# Patient Record
Sex: MALE | Race: ASIAN | NOT HISPANIC OR LATINO | Employment: FULL TIME | URBAN - METROPOLITAN AREA
[De-identification: names, ages, dates, MRNs, and addresses within clinical notes are randomized per-mention and may not be internally consistent; named-entity substitution may affect disease eponyms.]

---

## 2017-02-08 ENCOUNTER — TRANSFERRED RECORDS (OUTPATIENT)
Dept: HEALTH INFORMATION MANAGEMENT | Facility: CLINIC | Age: 57
End: 2017-02-08

## 2017-02-16 ENCOUNTER — OFFICE VISIT (OUTPATIENT)
Dept: FAMILY MEDICINE | Facility: CLINIC | Age: 57
End: 2017-02-16
Payer: COMMERCIAL

## 2017-02-16 VITALS
DIASTOLIC BLOOD PRESSURE: 83 MMHG | SYSTOLIC BLOOD PRESSURE: 132 MMHG | HEIGHT: 70 IN | TEMPERATURE: 98.3 F | HEART RATE: 71 BPM | BODY MASS INDEX: 23.08 KG/M2 | WEIGHT: 161.2 LBS

## 2017-02-16 DIAGNOSIS — H11.33 SUBCONJUNCTIVAL HEMORRHAGE OF BOTH EYES: ICD-10-CM

## 2017-02-16 DIAGNOSIS — Z11.59 NEED FOR HEPATITIS C SCREENING TEST: Primary | ICD-10-CM

## 2017-02-16 PROBLEM — Z71.89 ADVANCED DIRECTIVES, COUNSELING/DISCUSSION: Status: ACTIVE | Noted: 2017-02-16

## 2017-02-16 LAB
APTT PPP: 28 SEC (ref 22–37)
BASOPHILS # BLD AUTO: 0 10E9/L (ref 0–0.2)
BASOPHILS NFR BLD AUTO: 0.6 %
DIFFERENTIAL METHOD BLD: NORMAL
EOSINOPHIL # BLD AUTO: 0.3 10E9/L (ref 0–0.7)
EOSINOPHIL NFR BLD AUTO: 5 %
ERYTHROCYTE [DISTWIDTH] IN BLOOD BY AUTOMATED COUNT: 12.9 % (ref 10–15)
HCT VFR BLD AUTO: 47.8 % (ref 40–53)
HCV AB SERPL QL IA: NORMAL
HGB BLD-MCNC: 15.6 G/DL (ref 13.3–17.7)
INR PPP: 0.99 (ref 0.86–1.14)
LYMPHOCYTES # BLD AUTO: 1.6 10E9/L (ref 0.8–5.3)
LYMPHOCYTES NFR BLD AUTO: 24.6 %
MCH RBC QN AUTO: 28.6 PG (ref 26.5–33)
MCHC RBC AUTO-ENTMCNC: 32.6 G/DL (ref 31.5–36.5)
MCV RBC AUTO: 88 FL (ref 78–100)
MONOCYTES # BLD AUTO: 0.4 10E9/L (ref 0–1.3)
MONOCYTES NFR BLD AUTO: 6.2 %
NEUTROPHILS # BLD AUTO: 4.2 10E9/L (ref 1.6–8.3)
NEUTROPHILS NFR BLD AUTO: 63.6 %
PLATELET # BLD AUTO: 239 10E9/L (ref 150–450)
RBC # BLD AUTO: 5.46 10E12/L (ref 4.4–5.9)
WBC # BLD AUTO: 6.6 10E9/L (ref 4–11)

## 2017-02-16 PROCEDURE — 99213 OFFICE O/P EST LOW 20 MIN: CPT | Performed by: FAMILY MEDICINE

## 2017-02-16 PROCEDURE — 86803 HEPATITIS C AB TEST: CPT | Performed by: FAMILY MEDICINE

## 2017-02-16 PROCEDURE — 85025 COMPLETE CBC W/AUTO DIFF WBC: CPT | Performed by: FAMILY MEDICINE

## 2017-02-16 PROCEDURE — 85730 THROMBOPLASTIN TIME PARTIAL: CPT | Performed by: FAMILY MEDICINE

## 2017-02-16 PROCEDURE — 85610 PROTHROMBIN TIME: CPT | Performed by: FAMILY MEDICINE

## 2017-02-16 PROCEDURE — 36415 COLL VENOUS BLD VENIPUNCTURE: CPT | Performed by: FAMILY MEDICINE

## 2017-02-16 NOTE — PROGRESS NOTES
"SUBJECTIVE:  56 year old.The patient has a history of subconjunctival hemorrhages .  This started years ago. Location both eys he was seen by optometry at [facility] and it was suggested to get blood studies Associated symptoms are epistaxis.. ROS no blurred vision, blood in stool. melena  Reviewed health maintenance  Patient Active Problem List   Diagnosis     CARDIOVASCULAR SCREENING; LDL GOAL LESS THAN 160     Dry eye syndrome     TB (tuberculosis), treated     Diagnostic skin and sensitization tests     Seasonal allergic rhinitis     Seasonal allergic conjunctivitis     Allergic rhinitis due to animal dander     Allergy to mold spores     Rhinitis, allergic to other allergen     Esophageal reflux     Advanced directives, counseling/discussion     Cough     Hemoptysis     Pulmonary tuberculosis     Past Medical History   Diagnosis Date     Allergic rhinitis due to animal dander      Allergy to mold spores      6/11/12 skin tests pos. for:  cat/dog/CR/DM/M/T/G/W     Bell's palsy      Diagnostic skin and sensitization tests 6/11/12 skin tests pos. for:  cat/dog/CR/DM/M/T/G/W     Need for desensitization to allergens IT 6/12 to 8/13 only-stopped by pt, no hx of rxn--IT for cat/dog/DM/M/T/G/W      Rhinitis, allergic to other allergen      Seasonal allergic conjunctivitis      Seasonal allergic rhinitis        OBJECTIVE:  no apparent distress  /83  Pulse 71  Temp 98.3  F (36.8  C) (Oral)  Ht 5' 10\" (1.778 m)  Wt 161 lb 3.2 oz (73.1 kg)  BMI 23.13 kg/m2  Sclera is clear.   LUNGS:  CTA B/L, no wheezing or crackles.   Cardiovascular: negative, PMI normal. No lifts, heaves, or thrills. RRR. No murmurs, clicks gallops or rub   Gastrointestinal: Abdomen soft, non-tender. BS normal. No masses, organomegaly       ICD-10-CM    1. Need for hepatitis C screening test Z11.59 Hepatitis C Screen Reflex to HCV RNA Quant and Genotype   2. Subconjunctival hemorrhage of both eyes H11.33 CBC with platelets and differential     " INR     Partial thromboplastin time    PLAN: await blood work

## 2017-02-16 NOTE — NURSING NOTE
"Chief Complaint   Patient presents with     Eye Problem       Initial /83  Pulse 71  Temp 98.3  F (36.8  C) (Oral)  Ht 5' 10\" (1.778 m)  Wt 161 lb 3.2 oz (73.1 kg)  BMI 23.13 kg/m2 Estimated body mass index is 23.13 kg/(m^2) as calculated from the following:    Height as of this encounter: 5' 10\" (1.778 m).    Weight as of this encounter: 161 lb 3.2 oz (73.1 kg).  Medication Reconciliation: complete  Dick Hodge CMA    "

## 2017-09-20 ENCOUNTER — OFFICE VISIT (OUTPATIENT)
Dept: FAMILY MEDICINE | Facility: CLINIC | Age: 57
End: 2017-09-20
Payer: COMMERCIAL

## 2017-09-20 VITALS
SYSTOLIC BLOOD PRESSURE: 131 MMHG | TEMPERATURE: 97.7 F | BODY MASS INDEX: 22.7 KG/M2 | WEIGHT: 158.2 LBS | HEART RATE: 67 BPM | DIASTOLIC BLOOD PRESSURE: 84 MMHG

## 2017-09-20 DIAGNOSIS — R06.83 SNORING: ICD-10-CM

## 2017-09-20 DIAGNOSIS — Z23 NEED FOR VACCINATION: ICD-10-CM

## 2017-09-20 DIAGNOSIS — Z00.00 ROUTINE GENERAL MEDICAL EXAMINATION AT A HEALTH CARE FACILITY: Primary | ICD-10-CM

## 2017-09-20 DIAGNOSIS — K21.9 GASTROESOPHAGEAL REFLUX DISEASE WITHOUT ESOPHAGITIS: ICD-10-CM

## 2017-09-20 PROCEDURE — 90471 IMMUNIZATION ADMIN: CPT | Performed by: FAMILY MEDICINE

## 2017-09-20 PROCEDURE — 90715 TDAP VACCINE 7 YRS/> IM: CPT | Performed by: FAMILY MEDICINE

## 2017-09-20 PROCEDURE — 99213 OFFICE O/P EST LOW 20 MIN: CPT | Mod: 25 | Performed by: FAMILY MEDICINE

## 2017-09-20 PROCEDURE — 99396 PREV VISIT EST AGE 40-64: CPT | Mod: 25 | Performed by: FAMILY MEDICINE

## 2017-09-20 NOTE — NURSING NOTE
Prior to injection verified patient identity using patient's name and date of birth.    Patient instructed to wait in clinic for 20 minutes following injection and to notify staff of any adverse reactions immediately.     Screening Questionnaire for Adult Immunization     Are you sick today?   No   Do you have allergies to medications, food or any vaccine?   No   Have you ever had a serious reaction after receiving a vaccination?   No   Do you have a long-term health problem with heart disease, lung disease,  asthma, kidney disease, diabetes, anemia, metabolic or blood disease?   No   Do you have cancer, leukemia, AIDS, or any immune system problem?   No   Do you take cortisone, prednisone, other steroids, or anticancer drugs, or  have you had any x-ray (radiation) treatments?   No   Have you had a seizure, brain, or other nervous system problem?   No   During the past year, have you received a transfusion of blood or blood       products, or been given a medicine called immune (gamma) globulin?   No   For women: Are you pregnant or is there a chance you could become         pregnant during the next month?   No   Have you received any vaccinations in the past 4 weeks?   No    Immunization questionnaire answers were all negative.      MNVFC doesn't apply on this patient  Dick Hodge CMA  Prior to injection verified patient identity using patient's name and date of birth.  Per orders of Dr. Hargrove, injection of TDAP given by Dick Alvarez. Patient instructed to remain in clinic for 15 minutes afterwards, and to report any adverse reaction to me immediately.

## 2017-09-20 NOTE — NURSING NOTE
"Chief Complaint   Patient presents with     Physical       Initial /84  Pulse 67  Temp 97.7  F (36.5  C) (Oral)  Wt 158 lb 3.2 oz (71.8 kg)  BMI 22.7 kg/m2 Estimated body mass index is 22.7 kg/(m^2) as calculated from the following:    Height as of 2/16/17: 5' 10\" (1.778 m).    Weight as of this encounter: 158 lb 3.2 oz (71.8 kg).  Medication Reconciliation: complete  Dick Hodge CMA    "

## 2017-09-20 NOTE — PROGRESS NOTES
SUBJECTIVE:   CC: Stacia Abernathy is an 56 year old male who presents for preventative health visit.     Physical   Annual:     Getting at least 3 servings of Calcium per day::  Yes    Bi-annual eye exam::  Yes    Dental care twice a year::  Yes    Sleep apnea or symptoms of sleep apnea::  Excessive snoring    Diet::  Regular (no restrictions) and Diabetic    Taking medications regularly::  Yes    Medication side effects::  None    Additional concerns today::  No            Heart burn x 1.5-2 months    SUBJECTIVE:  56 year old.The patient has a history of gerd  Symptoms include heartburn. Caffeine intake quit when started. Etoh none asa or nsaids none smoking n. Better with time.  Worse with after a meal.  Nausea not presently vomiting  no. Endoscopy  Three years ago  Reviewed health maintenance   Patient Active Problem List   Diagnosis     CARDIOVASCULAR SCREENING; LDL GOAL LESS THAN 160     Dry eye syndrome     TB (tuberculosis), treated     Diagnostic skin and sensitization tests     Seasonal allergic rhinitis     Seasonal allergic conjunctivitis     Allergic rhinitis due to animal dander     Allergy to mold spores     Rhinitis, allergic to other allergen     Esophageal reflux     Advanced directives, counseling/discussion     Cough     Hemoptysis     Pulmonary tuberculosis     Gastroesophageal reflux disease without esophagitis     Snoring                   Today's PHQ-2 Score:   PHQ-2 ( 1999 Pfizer) 9/20/2017   Q1: Little interest or pleasure in doing things 0   Q2: Feeling down, depressed or hopeless 0   PHQ-2 Score 0   Q1: Little interest or pleasure in doing things Not at all   Q2: Feeling down, depressed or hopeless Not at all   PHQ-2 Score 0       Abuse: Current or Past(Physical, Sexual or Emotional)- No  Do you feel safe in your environment - Yes    Social History   Substance Use Topics     Smoking status: Former Smoker     Quit date: 9/11/2008     Smokeless tobacco: Never Used     Alcohol use Yes      Comment:  OCC     The patient does not drink >3 drinks per day nor >7 drinks per week.    Last PSA: No results found for: PSA    Reviewed orders with patient. Reviewed health maintenance and updated orders accordingly - Yes       Reviewed and updated as needed this visit by clinical staff  Tobacco  Allergies  Meds  Med Hx  Surg Hx  Fam Hx  Soc Hx        Reviewed and updated as needed this visit by Provider            ROS:  C: NEGATIVE for fever, chills, change in weight  I: NEGATIVE for worrisome rashes, moles or lesions  E: NEGATIVE for vision changes or irritation  ENT: NEGATIVE for ear, mouth and throat problems  R: NEGATIVE for significant cough or SOB  CV: NEGATIVE for chest pain, palpitations or peripheral edema   male: negative for dysuria, hematuria, decreased urinary stream, erectile dysfunction, urethral discharge  M: NEGATIVE for significant arthralgias or myalgia  N: NEGATIVE for weakness, dizziness or paresthesias  P: NEGATIVE for changes in mood or affect  Snores a lot and stops breathing sometimes.  He often wakes up and can't go back to sleep.  No napping during the day and does not have fatigue  OBJECTIVE:   /84  Pulse 67  Temp 97.7  F (36.5  C) (Oral)  Wt 158 lb 3.2 oz (71.8 kg)  BMI 22.7 kg/m2    EXAM:  GENERAL: healthy, alert and no distress  EYES: Eyes grossly normal to inspection, PERRL and conjunctivae and sclerae normal  HENT: ear canals and TM's normal, nose and mouth without ulcers or lesions  NECK: no adenopathy, no asymmetry, masses, or scars and thyroid normal to palpation  RESP: lungs clear to auscultation - no rales, rhonchi or wheezes  CV: regular rate and rhythm, normal S1 S2, no S3 or S4, no murmur, click or rub, no peripheral edema and peripheral pulses strong  ABDOMEN: soft, nontender, no hepatosplenomegaly, no masses and bowel sounds normal  MS: no gross musculoskeletal defects noted, no edema  SKIN: no suspicious lesions or rashes  NEURO: Normal strength and tone,  "mentation intact and speech normal  PSYCH: mentation appears normal, affect normal/bright    ASSESSMENT/PLAN:       ICD-10-CM    1. Routine general medical examination at a health care facility Z00.00    2. Gastroesophageal reflux disease without esophagitis K21.9 omeprazole (PRILOSEC) 20 MG CR capsule   3. Snoring R06.83 SLEEP EVALUATION & MANAGEMENT REFERRAL - ADULT       COUNSELING:   Reviewed preventive health counseling, as reflected in patient instructions       Regular exercise       Healthy diet/nutrition    BP Screening:   Last 3 BP Readings:    BP Readings from Last 3 Encounters:   09/20/17 131/84   02/16/17 132/83   01/14/16 117/77       The following was recommended to the patient:  Re-screen BP within a year and recommended lifestyle modifications       reports that he quit smoking about 9 years ago. He has never used smokeless tobacco.      Estimated body mass index is 22.7 kg/(m^2) as calculated from the following:    Height as of 2/16/17: 5' 10\" (1.778 m).    Weight as of this encounter: 158 lb 3.2 oz (71.8 kg).     Recheck one month if heart burn not relieved    Counseling Resources:  ATP IV Guidelines  Pooled Cohorts Equation Calculator  FRAX Risk Assessment  ICSI Preventive Guidelines  Dietary Guidelines for Americans, 2010  USDA's MyPlate  ASA Prophylaxis  Lung CA Screening    Juan Jose Hargrove MD  [facility]UST for HPI/ROS submitted by the patient on 9/20/2017   PHQ-2 Score: 0    "

## 2017-09-20 NOTE — PROGRESS NOTES
"  SUBJECTIVE:   CC: Stacia Abernathy is an 56 year old male who presents for preventative health visit.     Healthy Habits:    Do you get at least three servings of calcium containing foods daily (dairy, green leafy vegetables, etc.)? {YES/NO, DAIRY INTAKE:893633::\"yes\"}    Amount of exercise or daily activities, outside of work: {AMOUNT EXERCISE:241721}    Problems taking medications regularly {Yes /No default:900267::\"No\"}    Medication side effects: {Yes /No default.:643860::\"No\"}    Have you had an eye exam in the past two years? {YESNOBLANK:121888}    Do you see a dentist twice per year? {YESNOBLANK:297677}    Do you have sleep apnea, excessive snoring or daytime drowsiness?{YESNOBLANK:561999}    {Outside tests to abstract? :835240}    {additional problems to add (Optional):280018}    Today's PHQ-2 Score:   PHQ-2 ( 1999 Pfizer) 2/16/2017 1/14/2016   Q1: Little interest or pleasure in doing things 0 0   Q2: Feeling down, depressed or hopeless 0 0   PHQ-2 Score 0 0     {PHQ-2 LOOK IN ASSESSMENTS :454306}  Abuse: Current or Past(Physical, Sexual or Emotional)- {YES/NO/NA:726642}  Do you feel safe in your environment - {YES/NO/NA:742640}    Social History   Substance Use Topics     Smoking status: Former Smoker     Quit date: 9/11/2008     Smokeless tobacco: Never Used     Alcohol use Yes      Comment: OCC     {ETOH AUDIT:890791}    Last PSA: No results found for: PSA    Reviewed orders with patient. Reviewed health maintenance and updated orders accordingly - {Yes/No:538712::\"Yes\"}  {Chronicprobdata (Optional):729534}    Reviewed and updated as needed this visit by clinical staff         Reviewed and updated as needed this visit by Provider        {HISTORY OPTIONS (Optional):890524}    ROS:  {MALE ROS-adult preventive care package:453866::\"C: NEGATIVE for fever, chills, change in weight\",\"I: NEGATIVE for worrisome rashes, moles or lesions\",\"E: NEGATIVE for vision changes or irritation\",\"ENT: NEGATIVE for ear, mouth and " "throat problems\",\"R: NEGATIVE for significant cough or SOB\",\"CV: NEGATIVE for chest pain, palpitations or peripheral edema\",\"GI: NEGATIVE for nausea, abdominal pain, heartburn, or change in bowel habits\",\" male: negative for dysuria, hematuria, decreased urinary stream, erectile dysfunction, urethral discharge\",\"M: NEGATIVE for significant arthralgias or myalgia\",\"N: NEGATIVE for weakness, dizziness or paresthesias\",\"P: NEGATIVE for changes in mood or affect\"}    OBJECTIVE:   There were no vitals taken for this visit.  EXAM:  {Exam Choices:425213}    ASSESSMENT/PLAN:   {Diag Picklist:562309}    COUNSELING:  {MALE COUNSELING MESSAGES:461694::\"Reviewed preventive health counseling, as reflected in patient instructions\"}    {BP Counseling- Complete if BP >= 120/80  (Optional):930038}     reports that he quit smoking about 9 years ago. He has never used smokeless tobacco.  {Tobacco Cessation -- Complete if patient is a smoker (Optional):074748}  Estimated body mass index is 23.13 kg/(m^2) as calculated from the following:    Height as of 2/16/17: 5' 10\" (1.778 m).    Weight as of 2/16/17: 161 lb 3.2 oz (73.1 kg).   {Weight Management Plan (ACO) Complete if BMI is abnormal-  Ages 18-64  BMI >24.9.  Age 65+ with BMI <23 or >30 (Optional):624614}    Counseling Resources:  ATP IV Guidelines  Pooled Cohorts Equation Calculator  FRAX Risk Assessment  ICSI Preventive Guidelines  Dietary Guidelines for Americans, 2010  USDA's MyPlate  ASA Prophylaxis  Lung CA Screening    Juan Jose Hargrove MD  [facility]  "

## 2017-09-20 NOTE — MR AVS SNAPSHOT
After Visit Summary   9/20/2017    Stacia Abernathy    MRN: [id number]           Patient Information     Date Of Birth          1960        Visit Information        Provider Department      9/20/2017 8:00 AM Juan Jose Hargrove MD [facility]        Today's Diagnoses     Routine general medical examination at a health care facility    -  1    Gastroesophageal reflux disease without esophagitis        Snoring        Need for vaccination          Care Instructions      Preventive Health Recommendations  Male Ages 50 - 64    Yearly exam:             See your health care provider every year in order to  o   Review health changes.   o   Discuss preventive care.    o   Review your medicines if your doctor has prescribed any.     Have a cholesterol test every 5 years, or more frequently if you are at risk for high cholesterol/heart disease.     Have a diabetes test (fasting glucose) every three years. If you are at risk for diabetes, you should have this test more often.     Have a colonoscopy at age 50, or have a yearly FIT test (stool test). These exams will check for colon cancer.      Talk with your health care provider about whether or not a prostate cancer screening test (PSA) is right for you.    You should be tested each year for STDs (sexually transmitted diseases), if you re at risk.     Shots: Get a flu shot each year. Get a tetanus shot every 10 years.     Nutrition:    Eat at least 5 servings of fruits and vegetables daily.     Eat whole-grain bread, whole-wheat pasta and brown rice instead of white grains and rice.     Talk to your provider about Calcium and Vitamin D.     Lifestyle    Exercise for at least 150 minutes a week (30 minutes a day, 5 days a week). This will help you control your weight and prevent disease.     Limit alcohol to one drink per day.     No smoking.     Wear sunscreen to prevent skin cancer.     See your dentist every six months for an exam and cleaning.      See your eye doctor every 1 to 2 years.            Follow-ups after your visit        Additional Services     SLEEP EVALUATION & MANAGEMENT REFERRAL - ADULT       Please be aware that coverage of these services is subject to the terms and limitations of your health insurance plan.  Call member services at your health plan with any benefit or coverage questions.      Please bring the following to your appointment:    >>   List of current medications   >>   This referral request   >>   Any documents/labs given to you for this referral    [facility]  Ph [phone] (Age 15 and up)                  Who to contact     If you have questions or need follow up information about today's clinic visit or your schedule please contact [facility] directly at [phone].  Normal or non-critical lab and imaging results will be communicated to you by MyChart, letter or phone within 4 business days after the clinic has received the results. If you do not hear from us within 7 days, please contact the clinic through [employer] or phone. If you have a critical or abnormal lab result, we will notify you by phone as soon as possible.  Submit refill requests through [employer] or call your pharmacy and they will forward the refill request to us. Please allow 3 business days for your refill to be completed.          Additional Information About Your Visit        MyChart Information     [employer] gives you secure access to your electronic health record. If you see a primary care provider, you can also send messages to your care team and make appointments. If you have questions, please call your primary care clinic.  If you do not have a primary care provider, please call [phone] and they will assist you.        Care EveryWhere ID     This is your Care EveryWhere ID. This could be used by other organizations to access your [place] medical records  [id number]        Your Vitals Were     Pulse  Temperature BMI (Body Mass Index)             67 97.7  F (36.5  C) (Oral) 22.7 kg/m2          Blood Pressure from Last 3 Encounters:   09/20/17 131/84   02/16/17 132/83   01/14/16 117/77    Weight from Last 3 Encounters:   09/20/17 158 lb 3.2 oz (71.8 kg)   02/16/17 161 lb 3.2 oz (73.1 kg)   01/14/16 163 lb (73.9 kg)              We Performed the Following     1st  Administration  [34739]     TDAP VACCINE (ADACEL) [41698.002]          Today's Medication Changes          These changes are accurate as of: 9/20/17 11:59 PM.  If you have any questions, ask your nurse or doctor.               Start taking these medicines.        Dose/Directions    omeprazole 20 MG CR capsule   Commonly known as:  priLOSEC   Used for:  Gastroesophageal reflux disease without esophagitis   Started by:  Juan Jose Hargrove MD        Dose:  20 mg   Take 1 capsule (20 mg) by mouth daily   Quantity:  30 capsule   Refills:  1            Where to get your medicines      These medications were sent to [facility]/pharmacy #[id number] - [address],  [address] [address]     Phone:  [phone]     omeprazole 20 MG CR capsule                Primary Care Provider Office Phone # Fax #    Juan Jose Hargrove [phone] [address] [facility] [address]        Equal Access to Services     GAVIN MENODSA AH: Hadii aad ku hadasho Soomaali, waaxda luqadaha, qaybta kaalmada adeegyada, waxay idiin hayaan adeeg kharash la'aan . [facility] [phone].    ATENCIÓN: Si habla español, tiene a renteria disposición servicios gratuitos de asistencia lingüística. Llame al [phone].    We comply with applicable federal civil rights laws and [place] laws. We do not discriminate on the basis of race, color, national origin, age, disability sex, sexual orientation or gender identity.            Thank you!     Thank you for choosing [facility]  for your  care. Our goal is always to provide you with excellent care. Hearing back from our patients is one way we can continue to improve our services. Please take a few minutes to complete the written survey that you may receive in the mail after your visit with us. Thank you!             Your Updated Medication List - Protect others around you: Learn how to safely use, store and throw away your medicines at www.disposemymeds.org.          This list is accurate as of: 9/20/17 11:59 PM.  Always use your most recent med list.                   Brand Name Dispense Instructions for use Diagnosis    omeprazole 20 MG CR capsule    priLOSEC    30 capsule    Take 1 capsule (20 mg) by mouth daily    Gastroesophageal reflux disease without esophagitis

## 2018-01-19 ENCOUNTER — OFFICE VISIT (OUTPATIENT)
Dept: URGENT CARE | Facility: URGENT CARE | Age: 58
End: 2018-01-19
Payer: COMMERCIAL

## 2018-01-19 VITALS
TEMPERATURE: 97.5 F | WEIGHT: 158.6 LBS | OXYGEN SATURATION: 97 % | BODY MASS INDEX: 22.76 KG/M2 | SYSTOLIC BLOOD PRESSURE: 125 MMHG | DIASTOLIC BLOOD PRESSURE: 73 MMHG | HEART RATE: 82 BPM

## 2018-01-19 DIAGNOSIS — R05.9 COUGH: ICD-10-CM

## 2018-01-19 DIAGNOSIS — J01.90 ACUTE SINUSITIS WITH SYMPTOMS > 10 DAYS: Primary | ICD-10-CM

## 2018-01-19 PROCEDURE — 99214 OFFICE O/P EST MOD 30 MIN: CPT | Performed by: FAMILY MEDICINE

## 2018-01-19 RX ORDER — AMOXICILLIN 875 MG
875 TABLET ORAL 2 TIMES DAILY
Qty: 14 TABLET | Refills: 0 | Status: SHIPPED | OUTPATIENT
Start: 2018-01-19 | End: 2018-01-26

## 2018-01-19 RX ORDER — CODEINE PHOSPHATE/GUAIFENESIN 10-100MG/5
LIQUID (ML) ORAL
Qty: 150 ML | Refills: 0 | Status: SHIPPED | OUTPATIENT
Start: 2018-01-19 | End: 2018-01-26

## 2018-01-19 NOTE — MR AVS SNAPSHOT
After Visit Summary   1/19/2018    Stacia Abernathy    MRN: [id number]           Patient Information     Date Of Birth          1960        Visit Information        Provider Department      1/19/2018 6:15 PM Columba Slater MD [facility]        Today's Diagnoses     Acute sinusitis with symptoms > 10 days    -  1    Cough           Follow-ups after your visit        Who to contact     If you have questions or need follow up information about today's clinic visit or your schedule please contact [facility] directly at [phone].  Normal or non-critical lab and imaging results will be communicated to you by [employer], letter or phone within 4 business days after the clinic has received the results. If you do not hear from us within 7 days, please contact the clinic through [employer] or phone. If you have a critical or abnormal lab result, we will notify you by phone as soon as possible.  Submit refill requests through [employer] or call your pharmacy and they will forward the refill request to us. Please allow 3 business days for your refill to be completed.          Additional Information About Your Visit        MyChart Information     [employer] gives you secure access to your electronic health record. If you see a primary care provider, you can also send messages to your care team and make appointments. If you have questions, please call your primary care clinic.  If you do not have a primary care provider, please call [phone] and they will assist you.        Care EveryWhere ID     This is your Care EveryWhere ID. This could be used by other organizations to access your [place] medical records  [id number]        Your Vitals Were     Pulse Temperature Pulse Oximetry BMI (Body Mass Index)          82 97.5  F (36.4  C) (Tympanic) 97% 22.76 kg/m2         Blood Pressure from Last 3 Encounters:   01/19/18 125/73   09/20/17 131/84   02/16/17 132/83    Weight from Last 3  Encounters:   01/19/18 158 lb 9.6 oz (71.9 kg)   09/20/17 158 lb 3.2 oz (71.8 kg)   02/16/17 161 lb 3.2 oz (73.1 kg)              Today, you had the following     No orders found for display         Today's Medication Changes          These changes are accurate as of: 1/19/18 11:15 PM.  If you have any questions, ask your nurse or doctor.               Start taking these medicines.        Dose/Directions    amoxicillin 875 MG tablet   Commonly known as:  AMOXIL   Used for:  Acute sinusitis with symptoms > 10 days        Dose:  875 mg   Take 1 tablet (875 mg) by mouth 2 times daily for 7 days   Quantity:  14 tablet   Refills:  0       guaiFENesin-codeine 100-10 MG/5ML Syrp syrup   Commonly known as:  guaiFENesin AC   Used for:  Cough        Use 5 or 10 ml by mouth at bedtime as needed for coughing   Quantity:  150 mL   Refills:  0            Where to get your medicines      These medications were sent to [facility]/pharmacy #[id number] - [place] [address] [facility],  AT [address], [facility] [address]     Phone:  [phone]     amoxicillin 875 MG tablet         Some of these will need a paper prescription and others can be bought over the counter.  Ask your nurse if you have questions.     Bring a paper prescription for each of these medications     guaiFENesin-codeine 100-10 MG/5ML Syrp syrup                Primary Care Provider Office Phone # Fax #    Juan Jose Hargrove [phone] [address] [facility] [address]        Equal Access to Services     [facility] AH: Hadii easton aguiar hadasho Soomaali, waaxda luqadaha, qaybta kaalmada ademichaela, destiny higgins. So [facility] [phone].    ATENCIÓN: Si habla español, tiene a renteria disposición servicios gratuitos de asistencia lingüística. Llame al [phone].    We comply with applicable federal civil rights laws and [place] laws. We do not discriminate on the basis of  race, color, national origin, age, disability, sex, sexual orientation, or gender identity.            Thank you!     [facility] you [facility]  [facility] your [facility]. Our goal is always to provide you with excellent care. Hearing back from our patients is one way we can continue to improve our services. Please take a few minutes to complete the written survey that you may receive in the mail after your visit with us. Thank you!             Your Updated Medication List - Protect others around you: Learn how to safely use, store and throw away your medicines at www.disposemymeds.org.          This list is accurate as of: 1/19/18 11:15 PM.  Always use your most recent med list.                   Brand Name Dispense Instructions for use Diagnosis    amoxicillin 875 MG tablet    AMOXIL    14 tablet    Take 1 tablet (875 mg) by mouth 2 times daily for 7 days    Acute sinusitis with symptoms > 10 days       guaiFENesin-codeine 100-10 MG/5ML Syrp syrup    guaiFENesin AC    150 mL    Use 5 or 10 ml by mouth at bedtime as needed for coughing    Cough       omeprazole 20 MG CR capsule    priLOSEC    30 capsule    Take 1 capsule (20 mg) by mouth daily    Gastroesophageal reflux disease without esophagitis

## 2018-01-20 NOTE — PROGRESS NOTES
SUBJECTIVE:                                                    Stacia Abernathy is a 57 year old male who presents to clinic today for the following health issues:    RESPIRATORY SYMPTOMS      Duration: >1 week    Description  rhinorrhea, cough and headache    Severity: mild    Accompanying signs and symptoms: None    History (predisposing factors):  none    Precipitating or alleviating factors: None    Therapies tried and outcome:  Rest and fluids    A little over a week ago nasal congestion postnasal discharge coughing  Sinus headache tried over the counter aldo hernandez   Thought he was getting better but then came back again  Yesterday started having worsening sinus pressure   Exposure to pertussis or pertussis like symptoms: No  No chest pain no shortness of breath   Problem list and histories reviewed & adjusted, as indicated.  Additional history: as documented    Problem list, Medication list, Allergies, and Medical/Social/Surgical histories reviewed in EPIC and updated as appropriate.    ROS:  Constitutional, HEENT, cardiovascular, pulmonary, gi and gu systems are negative, except as otherwise noted.    OBJECTIVE:                                                    /73  Pulse 82  Temp 97.5  F (36.4  C) (Tympanic)  Wt 158 lb 9.6 oz (71.9 kg)  SpO2 97%  BMI 22.76 kg/m2  Body mass index is 22.76 kg/(m^2).  GENERAL: healthy, alert and no distress  EYES: Eyes grossly normal to inspection, PERRL and conjunctivae and sclerae normal  HENT: ear canals and TM's normal, nose and mouth without ulcers or lesions  Sinuses: turbinates erythematous maxillary sinus tenderness frontal sinus tenderness   NECK: no adenopathy, no asymmetry, masses, or scars and thyroid normal to palpation  RESP: lungs clear to auscultation - no rales, rhonchi or wheezes   CV: regular rate and rhythm, normal S1 S2, no S3 or S4, no murmur, click or rub, no peripheral edema and peripheral pulses strong  ABDOMEN: soft, nontender, no  hepatosplenomegaly, no masses and bowel sounds normal  MS: no gross musculoskeletal defects noted, no edema  SKIN: no suspicious lesions or rashes  NEURO: Normal strength and tone, mentation intact and speech normal  PSYCH: mentation appears normal, affect normal/bright    Diagnostic Test Results:  No results found for this or any previous visit (from the past 24 hour(s)).     ASSESSMENT/PLAN:                                                        ICD-10-CM    1. Acute sinusitis with symptoms > 10 days J01.90 amoxicillin (AMOXIL) 875 MG tablet   2. Cough R05 guaiFENesin-codeine (GUAIFENESIN AC) 100-10 MG/5ML SYRP syrup     Prescribed with amoxicillin  Prescribed with guaifenesin ac. Warned sedating and habit forming  Sedating medications given. Aware not to drive or operate machinery while on these medications. Caution with .   No thoughts of harming self or others  Influenza has been going around as well and symptoms may be secondary to influenza however patient already past 2 days of symptoms and treatment for influenza at this point is largely supportive. Offered testing for confirmation patient declined  Recommend follow up with primary care provider if no relief, sooner if worse  Adverse reactions of medications discussed.  Over the counter medications discussed.   Aware to come back in if with worsening symptoms or if no relief despite treatment plan  Patient voiced understanding and had no further questions.     MD Columba Terry MD  [facility]

## 2018-01-26 ENCOUNTER — OFFICE VISIT (OUTPATIENT)
Dept: INTERNAL MEDICINE | Facility: CLINIC | Age: 58
End: 2018-01-26
Payer: COMMERCIAL

## 2018-01-26 VITALS
RESPIRATION RATE: 20 BRPM | SYSTOLIC BLOOD PRESSURE: 137 MMHG | OXYGEN SATURATION: 97 % | BODY MASS INDEX: 22.81 KG/M2 | DIASTOLIC BLOOD PRESSURE: 85 MMHG | WEIGHT: 159 LBS | TEMPERATURE: 98.8 F | HEART RATE: 73 BPM

## 2018-01-26 DIAGNOSIS — K21.9 GASTROESOPHAGEAL REFLUX DISEASE, ESOPHAGITIS PRESENCE NOT SPECIFIED: Primary | ICD-10-CM

## 2018-01-26 PROCEDURE — 99213 OFFICE O/P EST LOW 20 MIN: CPT | Performed by: INTERNAL MEDICINE

## 2018-01-26 RX ORDER — FAMOTIDINE 20 MG/1
20 TABLET, FILM COATED ORAL 2 TIMES DAILY
Qty: 60 TABLET | Refills: 1 | Status: SHIPPED | OUTPATIENT
Start: 2018-01-26 | End: 2018-08-14

## 2018-01-26 NOTE — MR AVS SNAPSHOT
After Visit Summary   1/26/2018    Stacia Abernathy    MRN: [id number]           Patient Information     Date Of Birth          1960        Visit Information        Provider Department      1/26/2018 12:10 PM Gifty Young MD [facility]        Today's Diagnoses     Gastroesophageal reflux disease, esophagitis presence not specified    -  1      Care Instructions    Please complete your antibiotics course.  Start Pepcid and return to clinic if your symptoms are not significantly better by Monday.            Follow-ups after your visit        Who to contact     If you have questions or need follow up information about today's clinic visit or your schedule please contact [facility] directly at [phone].  Normal or non-critical lab and imaging results will be communicated to you by MyChart, letter or phone within 4 business days after the clinic has received the results. If you do not hear from us within 7 days, please contact the clinic through [employer] or phone. If you have a critical or abnormal lab result, we will notify you by phone as soon as possible.  Submit refill requests through [employer] or call your pharmacy and they will forward the refill request to us. Please allow 3 business days for your refill to be completed.          Additional Information About Your Visit        MyChart Information     [employer] gives you secure access to your electronic health record. If you see a primary care provider, you can also send messages to your care team and make appointments. If you have questions, please call your primary care clinic.  If you do not have a primary care provider, please call [phone] and they will assist you.        Care EveryWhere ID     This is your Care EveryWhere ID. This could be used by other organizations to access your [place] medical records  [id number]        Your Vitals Were     Pulse Temperature Respirations Pulse Oximetry BMI  (Body Mass Index)       73 98.8  F (37.1  C) (Oral) 20 97% 22.81 kg/m2        Blood Pressure from Last 3 Encounters:   01/26/18 137/85   01/19/18 125/73   09/20/17 131/84    Weight from Last 3 Encounters:   01/26/18 159 lb (72.1 kg)   01/19/18 158 lb 9.6 oz (71.9 kg)   09/20/17 158 lb 3.2 oz (71.8 kg)              Today, you had the following     No orders found for display         Today's Medication Changes          These changes are accurate as of 1/26/18 12:28 PM.  If you have any questions, ask your nurse or doctor.               Start taking these medicines.        Dose/Directions    famotidine 20 MG tablet   Commonly known as:  PEPCID   Used for:  Gastroesophageal reflux disease, esophagitis presence not specified   Started by:  Gifty Young MD        Dose:  20 mg   Take 1 tablet (20 mg) by mouth 2 times daily   Quantity:  60 tablet   Refills:  1         Stop taking these medicines if you haven't already. Please contact your care team if you have questions.     amoxicillin 875 MG tablet   Commonly known as:  AMOXIL   Stopped by:  Gifty Young MD           guaiFENesin-codeine 100-10 MG/5ML Syrp syrup   Commonly known as:  guaiFENesin AC   Stopped by:  Gifty Young MD           omeprazole 20 MG CR capsule   Commonly known as:  priLOSEC   Stopped by:  Gifty Young MD                Where to get your medicines      These medications were sent to [facility]/pharmacy #[id number] - [place], [place] - [address],  AT CORNER OF [facility]  [address], , [facility] [address]     Phone:  [phone]     famotidine 20 MG tablet                Primary Care Provider Office Phone # Fax #    Juan Jose Hargrove [phone] [address] [facility] [address]        Equal Access to Services     [facility] DEONDRE AH: Hadii easton Tyler, tamara mcclendon, qaybta kaaldestiny betancourt  ladonovan higgins. So [facility] [phone].    ATENCIÓN: Si habla oneal, tiene a renteria disposición servicios gratuitos de asistencia lingüística. Jadiel al [phone].    We comply with applicable federal civil rights laws and [place] laws. We do not discriminate on the basis of race, color, national origin, age, disability, sex, sexual orientation, or gender identity.            Thank you!     Thank you for choosing [facility]  for your care. Our goal is always to provide you with excellent care. Hearing back from our patients is one way we can continue to improve our services. Please take a few minutes to complete the written survey that you may receive in the mail after your visit with us. Thank you!             Your Updated Medication List - Protect others around you: Learn how to safely use, store and throw away your medicines at www.disposemymeds.org.          This list is accurate as of 1/26/18 12:28 PM.  Always use your most recent med list.                   Brand Name Dispense Instructions for use Diagnosis    famotidine 20 MG tablet    PEPCID    60 tablet    Take 1 tablet (20 mg) by mouth 2 times daily    Gastroesophageal reflux disease, esophagitis presence not specified

## 2018-01-26 NOTE — PROGRESS NOTES
SUBJECTIVE:   Stacia Abernathy is a 57 year old male who presents to clinic today for the following health issues:      ED[facility]UC Followup:    [facility]  Date of visit: last Friday, seen at  on 1.19.18: rxed plain amox; also the flu was on the differential diagnosis.  Reason for visit: chest congestion,cough   Current Status: feeling better still having cough and something stuck in throat; chest pressure when breathes deeply     Patient is feeling much better but still feels like something getting stuck in his throat   Coughing up only light yellow sputum  No heartburn.      Reviewed and updated as needed this visit by clinical staff  Tobacco  Allergies  Meds  Problems       Reviewed and updated as needed this visit by Provider  Meds  Problems         Patient Active Problem List   Diagnosis     CARDIOVASCULAR SCREENING; LDL GOAL LESS THAN 160     Dry eye syndrome     TB (tuberculosis), treated     Diagnostic skin and sensitization tests     Seasonal allergic rhinitis     Seasonal allergic conjunctivitis     Allergic rhinitis due to animal dander     Allergy to mold spores     Rhinitis, allergic to other allergen     Esophageal reflux     Advanced directives, counseling/discussion     Cough     Hemoptysis     Pulmonary tuberculosis     Gastroesophageal reflux disease without esophagitis     Snoring       Past Medical History:   Diagnosis Date     Allergic rhinitis due to animal dander      Allergy to mold spores     6/11/12 skin tests pos. for:  cat/dog/CR/DM/M/T/G/W     Bell's palsy      Diagnostic skin and sensitization tests 6/11/12 skin tests pos. for:  cat/dog/CR/DM/M/T/G/W     Need for desensitization to allergens IT 6/12 to 8/13 only-stopped by pt, no hx of rxn--IT for cat/dog/DM/M/T/G/W      Rhinitis, allergic to other allergen      Seasonal allergic conjunctivitis      Seasonal allergic rhinitis        Past Surgical History:   Procedure Laterality Date     COLONOSCOPY  3/4/2013    Procedure:  COLONOSCOPY;  screening colonoscopy,;  Surgeon: Tai Monroe MD;  Location: [facility] OR     ESOPHAGOSCOPY, GASTROSCOPY, DUODENOSCOPY (EGD), COMBINED N/A 11/3/2014    Procedure: COMBINED ESOPHAGOSCOPY, GASTROSCOPY, DUODENOSCOPY (EGD), BIOPSY SINGLE OR MULTIPLE;  Surgeon: Renato Vasquez MD;  Location: [facility] OR       Family History   Problem Relation Age of Onset     Hypertension Mother      CANCER Father      lung     DIABETES Sister      DIABETES Brother      Unknown/Adopted No family hx of      Macular Degeneration No family hx of      Glaucoma No family hx of      Thyroid Disease No family hx of      CEREBROVASCULAR DISEASE No family hx of        Social History   Substance Use Topics     Smoking status: Former Smoker     Quit date: 9/11/2008     Smokeless tobacco: Never Used     Alcohol use Yes      Comment: OCC       Current Outpatient Prescriptions   Medication     famotidine (PEPCID) 20 MG tablet     No current facility-administered medications for this visit.          ROS:  Constitutional, HEENT, cardiovascular, pulmonary, GI, , musculoskeletal, neuro, skin, endocrine and psych systems are negative, except as otherwise noted.     OBJECTIVE:                                                    /85  Pulse 73  Temp 98.8  F (37.1  C) (Oral)  Resp 20  Wt 159 lb (72.1 kg)  SpO2 97%  BMI 22.81 kg/m2     GENERAL APPEARANCE: healthy, alert and in no distress  EYES: Eyes grossly normal to inspection, and conjunctivae and sclerae normal  HENT: head normocephalic and atraumatic and mouth without ulcers or lesions, oropharynx clear and oral mucous membranes moist  NECK: no noticeable adenopathy, no asymmetry, masses, or scars   RESP: lungs clear to auscultation - no rales, rhonchi or wheezes  CV: regular rate and rhythm, normal S1 S2, no S3 or S4, no murmur, click or rub, no peripheral edema and peripheral pulses strong  ABDOMEN: soft, nontender, no hepatosplenomegaly, no masses and bowel sounds  normal  MS: no musculoskeletal defects are noted and gait is age appropriate without ataxia  SKIN: no suspicious lesions or rashes  NEURO: mentation intact and speech normal  PSYCH: mentation appears normal and affect normal/bright.    Results for orders placed or performed in visit on 02/16/17   Hepatitis C Screen Reflex to HCV RNA Quant and Genotype   Result Value Ref Range    Hepatitis C Antibody  NR     Nonreactive   Assay performance characteristics have not been established for newborns,   infants, and children     CBC with platelets and differential   Result Value Ref Range    WBC 6.6 4.0 - 11.0 10e9/L    RBC Count 5.46 4.4 - 5.9 10e12/L    Hemoglobin 15.6 13.3 - 17.7 g/dL    Hematocrit 47.8 40.0 - 53.0 %    MCV 88 78 - 100 fl    MCH 28.6 26.5 - 33.0 pg    MCHC 32.6 31.5 - 36.5 g/dL    RDW 12.9 10.0 - 15.0 %    Platelet Count 239 150 - 450 10e9/L    Diff Method Automated Method     % Neutrophils 63.6 %    % Lymphocytes 24.6 %    % Monocytes 6.2 %    % Eosinophils 5.0 %    % Basophils 0.6 %    Absolute Neutrophil 4.2 1.6 - 8.3 10e9/L    Absolute Lymphocytes 1.6 0.8 - 5.3 10e9/L    Absolute Monocytes 0.4 0.0 - 1.3 10e9/L    Absolute Eosinophils 0.3 0.0 - 0.7 10e9/L    Absolute Basophils 0.0 0.0 - 0.2 10e9/L   INR   Result Value Ref Range    INR 0.99 0.86 - 1.14   Partial thromboplastin time   Result Value Ref Range    PTT 28 22 - 37 sec       No results found for this or any previous visit (from the past 744 hour(s)).      ASSESSMENT/PLAN:                                                        ICD-10-CM    1. Gastroesophageal reflux disease, esophagitis presence not specified K21.9 famotidine (PEPCID) 20 MG tablet       Patient Instructions   Please complete your antibiotics course.  Start Pepcid and return to clinic if your symptoms are not significantly better by Monday.        Gifty Young MD    [address]  [id number] [phone]

## 2018-01-26 NOTE — PATIENT INSTRUCTIONS
Please complete your antibiotics course.  Start Pepcid and return to clinic if your symptoms are not significantly better by Monday.

## 2018-02-07 ENCOUNTER — RADIANT APPOINTMENT (OUTPATIENT)
Dept: GENERAL RADIOLOGY | Facility: CLINIC | Age: 58
End: 2018-02-07
Attending: FAMILY MEDICINE
Payer: COMMERCIAL

## 2018-02-07 ENCOUNTER — OFFICE VISIT (OUTPATIENT)
Dept: FAMILY MEDICINE | Facility: CLINIC | Age: 58
End: 2018-02-07
Payer: COMMERCIAL

## 2018-02-07 VITALS
HEART RATE: 80 BPM | SYSTOLIC BLOOD PRESSURE: 138 MMHG | TEMPERATURE: 98.5 F | HEIGHT: 70 IN | OXYGEN SATURATION: 97 % | DIASTOLIC BLOOD PRESSURE: 72 MMHG | BODY MASS INDEX: 23.08 KG/M2 | WEIGHT: 161.2 LBS

## 2018-02-07 DIAGNOSIS — R05.9 COUGH: Primary | ICD-10-CM

## 2018-02-07 DIAGNOSIS — R05.9 COUGH: ICD-10-CM

## 2018-02-07 PROCEDURE — 71046 X-RAY EXAM CHEST 2 VIEWS: CPT | Mod: FY

## 2018-02-07 PROCEDURE — 99213 OFFICE O/P EST LOW 20 MIN: CPT | Performed by: FAMILY MEDICINE

## 2018-02-07 RX ORDER — AZITHROMYCIN 250 MG/1
TABLET, FILM COATED ORAL
Qty: 6 TABLET | Refills: 0 | Status: SHIPPED | OUTPATIENT
Start: 2018-02-07 | End: 2018-08-14

## 2018-02-07 ASSESSMENT — ENCOUNTER SYMPTOMS
SWEATS: 0
CHILLS: 0
COUGH: 1

## 2018-02-07 NOTE — MR AVS SNAPSHOT
"              After Visit Summary   2/7/2018    Stacia Abernathy    MRN: [id number]           Patient Information     Date Of Birth          1960        Visit Information        Provider Department      2/7/2018 8:30 AM Juan Jose Hargrove MD [facility]        Today's Diagnoses     Cough    -  1       Follow-ups after your visit        Who to contact     If you have questions or need follow up information about today's clinic visit or your schedule please contact [facility] directly at [phone].  Normal or non-critical lab and imaging results will be communicated to you by MyChart, letter or phone within 4 business days after the clinic has received the results. If you do not hear from us within 7 days, please contact the clinic through [employer] or phone. If you have a critical or abnormal lab result, we will notify you by phone as soon as possible.  Submit refill requests through [employer] or call your pharmacy and they will forward the refill request to us. Please allow 3 business days for your refill to be completed.          Additional Information About Your Visit        MyChart Information     [employer] gives you secure access to your electronic health record. If you see a primary care provider, you can also send messages to your care team and make appointments. If you have questions, please call your primary care clinic.  If you do not have a primary care provider, please call [phone] and they will assist you.        Care EveryWhere ID     This is your Care EveryWhere ID. This could be used by other organizations to access your [place] medical records  [id number]        Your Vitals Were     Pulse Temperature Height Pulse Oximetry BMI (Body Mass Index)       80 98.5  F (36.9  C) (Oral) 5' 10\" (1.778 m) 97% 23.13 kg/m2        Blood Pressure from Last 3 Encounters:   02/07/18 138/72   01/26/18 137/85   01/19/18 125/73    Weight from Last 3 Encounters:   02/07/18 161 lb 3.2 oz " (73.1 kg)   01/26/18 159 lb (72.1 kg)   01/19/18 158 lb 9.6 oz (71.9 kg)                 Today's Medication Changes          These changes are accurate as of 2/7/18  9:04 AM.  If you have any questions, ask your nurse or doctor.               Start taking these medicines.        Dose/Directions    azithromycin 250 MG tablet   Commonly known as:  ZITHROMAX   Used for:  Cough   Started by:  Juan Jose Hargrove MD        Two tablets first day, then one tablet daily for four days.   Quantity:  6 tablet   Refills:  0            Where to get your medicines      These medications were sent to [facility]/pharmacy #[id number] - [facility] [address],  [address] [place] [address], [facility] [address]     Phone:  [phone]     azithromycin 250 MG tablet                Primary Care Provider Office Phone # Fax #    Juan Jose Hargrove [phone] [address] [facility] [address]        Equal Access to Services     [facility]IN [facility] : Hadii aad ku hadasho Soomaali, waaxda luqadaha, qaybta kaalmada adeegyada, waxay idiin hayaan adam esquivel . So [facility] [phone].    ATENCIÓN: Si habla español, tiene a renteria disposición servicios gratuitos de asistencia lingüística. Llame al [phone].    We comply with applicable federal civil rights laws and [place] laws. We do not discriminate on the basis of race, color, national origin, age, disability, sex, sexual orientation, or gender identity.            Thank you!     Thank you for choosing [facility]  for your care. Our goal is always to provide you with excellent care. Hearing back from our patients is one way we can continue to improve our services. Please take a few minutes to complete the written survey that you may receive in the mail after your visit with us. Thank you!             Your Updated Medication List - Protect others around you: Learn how to safely use, store and throw away your  medicines at www.disposemymeds.org.          This list is accurate as of 2/7/18  9:04 AM.  Always use your most recent med list.                   Brand Name Dispense Instructions for use Diagnosis    azithromycin 250 MG tablet    ZITHROMAX    6 tablet    Two tablets first day, then one tablet daily for four days.    Cough       famotidine 20 MG tablet    PEPCID    60 tablet    Take 1 tablet (20 mg) by mouth 2 times daily    Gastroesophageal reflux disease, esophagitis presence not specified

## 2018-02-07 NOTE — PROGRESS NOTES
"Cough   This is a new problem. Episode onset: 3 weeks. The cough is productive of sputum. There has been no fever. Associated symptoms include chest pain. Pertinent negatives include no chills and no sweats.     No blood in sputum    Review of Systems   Constitutional: Negative for chills.   Respiratory: Positive for cough.    Cardiovascular: Positive for chest pain.     OBJECTIVE:  no apparent distress  /72  Pulse 80  Temp 98.5  F (36.9  C) (Oral)  Ht 5' 10\" (1.778 m)  Wt 161 lb 3.2 oz (73.1 kg)  SpO2 97%  BMI 23.13 kg/m2       Physical Exam   Constitutional: He is well-developed, well-nourished, and in no distress.   HENT:   Right Ear: External ear normal.   Left Ear: External ear normal.   Mouth/Throat: Oropharynx is clear and moist.   Cardiovascular: Normal rate and normal heart sounds.    Pulmonary/Chest: Effort normal and breath sounds normal.         ICD-10-CM    1. Cough R05 XR Chest 2 Views     azithromycin (ZITHROMAX) 250 MG tablet    PLAN:Retun to clinic if symptoms worsen   "

## 2018-02-07 NOTE — NURSING NOTE
"Chief Complaint   Patient presents with     Cough     seen in urgent care 3 weeks ago for sore throat and cough, has been taking robatussin for coughing, second urgent care visit was given pepcid       Initial /80  Pulse 80  Temp 98.5  F (36.9  C) (Oral)  Ht 5' 10\" (1.778 m)  Wt 161 lb 3.2 oz (73.1 kg)  SpO2 97%  BMI 23.13 kg/m2 Estimated body mass index is 23.13 kg/(m^2) as calculated from the following:    Height as of this encounter: 5' 10\" (1.778 m).    Weight as of this encounter: 161 lb 3.2 oz (73.1 kg).  Medication Reconciliation: complete  Dick Hodge CMA    "

## 2018-08-14 ENCOUNTER — OFFICE VISIT (OUTPATIENT)
Dept: FAMILY MEDICINE | Facility: CLINIC | Age: 58
End: 2018-08-14
Payer: COMMERCIAL

## 2018-08-14 VITALS
HEART RATE: 75 BPM | WEIGHT: 153 LBS | RESPIRATION RATE: 18 BRPM | TEMPERATURE: 97.6 F | DIASTOLIC BLOOD PRESSURE: 90 MMHG | SYSTOLIC BLOOD PRESSURE: 143 MMHG | OXYGEN SATURATION: 99 % | BODY MASS INDEX: 21.95 KG/M2

## 2018-08-14 DIAGNOSIS — K21.00 GASTROESOPHAGEAL REFLUX DISEASE WITH ESOPHAGITIS: Primary | ICD-10-CM

## 2018-08-14 PROCEDURE — 99213 OFFICE O/P EST LOW 20 MIN: CPT | Performed by: FAMILY MEDICINE

## 2018-08-14 RX ORDER — OMEPRAZOLE 40 MG/1
40 CAPSULE, DELAYED RELEASE ORAL DAILY
Qty: 30 CAPSULE | Refills: 1 | Status: SHIPPED | OUTPATIENT
Start: 2018-08-14 | End: 2018-09-19

## 2018-08-14 ASSESSMENT — PAIN SCALES - GENERAL: PAINLEVEL: NO PAIN (0)

## 2018-08-14 NOTE — NURSING NOTE
"Chief Complaint   Patient presents with     Gastrophageal Reflux     heart burn - reflux - upper abdomen pain -belching - gas       Initial /90  Pulse 75  Temp 97.6  F (36.4  C) (Oral)  Resp 18  Wt 153 lb (69.4 kg)  SpO2 99%  BMI 21.95 kg/m2 Estimated body mass index is 21.95 kg/(m^2) as calculated from the following:    Height as of 2/7/18: 5' 10\" (1.778 m).    Weight as of this encounter: 153 lb (69.4 kg).  Medication Reconciliation: complete  Indy Toribio M.A.    "

## 2018-08-14 NOTE — MR AVS SNAPSHOT
After Visit Summary   8/14/2018    Stacia Abernathy    MRN: [id number]           Patient Information     Date Of Birth          1960        Visit Information        Provider Department      8/14/2018 2:45 PM Juan Jose Hargrove MD [facility]        Today's Diagnoses     Gastroesophageal reflux disease with esophagitis    -  1       Follow-ups after your visit        Who to contact     If you have questions or need follow up information about today's clinic visit or your schedule please contact [facility] directly at [phone].  Normal or non-critical lab and imaging results will be communicated to you by MyChart, letter or phone within 4 business days after the clinic has received the results. If you do not hear from us within 7 days, please contact the clinic through [employer] or phone. If you have a critical or abnormal lab result, we will notify you by phone as soon as possible.  Submit refill requests through [employer] or call your pharmacy and they will forward the refill request to us. Please allow 3 business days for your refill to be completed.          Additional Information About Your Visit        MyChart Information     [employer] gives you secure access to your electronic health record. If you see a primary care provider, you can also send messages to your care team and make appointments. If you have questions, please call your primary care clinic.  If you do not have a primary care provider, please call [phone] and they will assist you.        Care EveryWhere ID     This is your Care EveryWhere ID. This could be used by other organizations to access your [place] medical records  [id number]        Your Vitals Were     Pulse Temperature Respirations Pulse Oximetry BMI (Body Mass Index)       75 97.6  F (36.4  C) (Oral) 18 99% 21.95 kg/m2        Blood Pressure from Last 3 Encounters:   08/14/18 143/90   02/07/18 138/72   01/26/18 137/85    Weight from Last 3  Encounters:   08/14/18 153 lb (69.4 kg)   02/07/18 161 lb 3.2 oz (73.1 kg)   01/26/18 159 lb (72.1 kg)              Today, you had the following     No orders found for display         Today's Medication Changes          These changes are accurate as of 8/14/18  3:07 PM.  If you have any questions, ask your nurse or doctor.               These medicines have changed or have updated prescriptions.        Dose/Directions    * OMEPRAZOLE PO   This may have changed:  Another medication with the same name was added. Make sure you understand how and when to take each.   Changed by:  Juan Jose Hargrove MD        Dose:  10 mg   Take 10 mg by mouth every morning   Refills:  0       * omeprazole 40 MG capsule   Commonly known as:  priLOSEC   This may have changed:  You were already taking a medication with the same name, and this prescription was added. Make sure you understand how and when to take each.   Used for:  Gastroesophageal reflux disease with esophagitis   Changed by:  Juan Jose Hargrove MD        Dose:  40 mg   Take 1 capsule (40 mg) by mouth daily Take 30-60 minutes before a meal.   Quantity:  30 capsule   Refills:  1       * Notice:  This list has 2 medication(s) that are the same as other medications prescribed for you. Read the directions carefully, and ask your doctor or other care provider to review them with you.         Where to get your medicines      These medications were sent to [facility] [address], [address]  [address] [address] [address]     Phone:  [phone]     omeprazole 40 MG capsule                Primary Care Provider Office Phone # Fax #    Juan Jose Hargrove [phone] [address] [facility] [address]        Equal Access to Services     NAHUN MENDOSA AH: Rebecca Tyler, tamara mcclendon, destiny romero. So [facility]  [phone].    ATENCIÓN: Si dane no, tiene a renteria disposición servicios gratuitos de asistencia lingüística. Jadiel verma [phone].    We comply with applicable federal civil rights laws and [place] laws. We do not discriminate on the basis of race, color, national origin, age, disability, sex, sexual orientation, or gender identity.            Thank you!     Thank you for choosing [facility]  [facility]. Our goal is always to provide you with excellent care. Hearing back from our patients is one way we can continue to improve our services. Please take a few minutes to complete the written survey that you may receive in the mail after your visit with us. Thank you!             Your Updated Medication List - Protect others around you: Learn how to safely use, store and throw away your medicines at www.disposemymeds.org.          This list is accurate as of 8/14/18  3:07 PM.  Always use your most recent med list.                   Brand Name Dispense Instructions for use Diagnosis    * OMEPRAZOLE PO      Take 10 mg by mouth every morning        * omeprazole 40 MG capsule    priLOSEC    30 capsule    Take 1 capsule (40 mg) by mouth daily Take 30-60 minutes before a meal.    Gastroesophageal reflux disease with esophagitis       * Notice:  This list has 2 medication(s) that are the same as other medications prescribed for you. Read the directions carefully, and ask your doctor or other care provider to review them with you.

## 2018-08-14 NOTE — PROGRESS NOTES
"SUBJECTIVE:  57 year old.The patient has a history of gerd for 3 1/2  weeks.  Symptoms include heartburn. Caffeine intake none . Etoh n asa or nsaids 81 per day smoking no. Better with bowel movment.  Worse with after a meal  Nausea no vomiting no. Endoscopy no  Reviewed health maintenance   Patient Active Problem List   Diagnosis     CARDIOVASCULAR SCREENING; LDL GOAL LESS THAN 160     Dry eye syndrome     TB (tuberculosis), treated     Diagnostic skin and sensitization tests     Seasonal allergic rhinitis     Seasonal allergic conjunctivitis     Allergic rhinitis due to animal dander     Allergy to mold spores     Rhinitis, allergic to other allergen     Esophageal reflux     Advanced directives, counseling/discussion     Cough     Hemoptysis     Pulmonary tuberculosis     Gastroesophageal reflux disease without esophagitis     Snoring         OBJECTIVE:  Exam:  Constitutional: healthy, alert and no distress  Head: Normocephalic. No masses, lesions, tenderness or abnormalities  Neck: Neck supple. No adenopathy. Thyroid symmetric, normal size,, Carotids without bruits.  ENT: ENT exam normal, no neck nodes or sinus tenderness  Cardiovascular: negative, PMI normal. No lifts, heaves, or thrills. RRR. No murmurs, clicks gallops or rub  Respiratory: negative\",Good diaphragmatic excursion. Lungs clear  Gastrointestinal: Abdomen soft, non-tender. BS normal. No masses, organomegaly         ICD-10-CM    1. Gastroesophageal reflux disease with esophagitis K21.0 omeprazole (PRILOSEC) 40 MG capsule    PLAN:Recheck one month       "

## 2018-09-19 ENCOUNTER — OFFICE VISIT (OUTPATIENT)
Dept: FAMILY MEDICINE | Facility: CLINIC | Age: 58
End: 2018-09-19
Payer: COMMERCIAL

## 2018-09-19 VITALS
HEIGHT: 70 IN | OXYGEN SATURATION: 99 % | DIASTOLIC BLOOD PRESSURE: 80 MMHG | SYSTOLIC BLOOD PRESSURE: 133 MMHG | BODY MASS INDEX: 21.76 KG/M2 | RESPIRATION RATE: 16 BRPM | WEIGHT: 152 LBS | TEMPERATURE: 97.4 F | HEART RATE: 61 BPM

## 2018-09-19 DIAGNOSIS — Z13.1 SCREENING FOR DIABETES MELLITUS: ICD-10-CM

## 2018-09-19 DIAGNOSIS — K21.00 GASTROESOPHAGEAL REFLUX DISEASE WITH ESOPHAGITIS: ICD-10-CM

## 2018-09-19 DIAGNOSIS — Z13.220 LIPID SCREENING: ICD-10-CM

## 2018-09-19 DIAGNOSIS — Z23 NEED FOR PROPHYLACTIC VACCINATION AND INOCULATION AGAINST INFLUENZA: Primary | ICD-10-CM

## 2018-09-19 PROCEDURE — 90686 IIV4 VACC NO PRSV 0.5 ML IM: CPT | Performed by: FAMILY MEDICINE

## 2018-09-19 PROCEDURE — 90471 IMMUNIZATION ADMIN: CPT | Performed by: FAMILY MEDICINE

## 2018-09-19 PROCEDURE — 99213 OFFICE O/P EST LOW 20 MIN: CPT | Mod: 25 | Performed by: FAMILY MEDICINE

## 2018-09-19 RX ORDER — OMEPRAZOLE 40 MG/1
40 CAPSULE, DELAYED RELEASE ORAL DAILY
Qty: 90 CAPSULE | Refills: 3 | Status: SHIPPED | OUTPATIENT
Start: 2018-09-19 | End: 2019-11-20

## 2018-09-19 ASSESSMENT — PAIN SCALES - GENERAL: PAINLEVEL: NO PAIN (0)

## 2018-09-19 NOTE — NURSING NOTE
"Chief Complaint   Patient presents with     RECHECK     heart burn       Initial /80  Pulse 61  Temp 97.4  F (36.3  C) (Oral)  Resp 16  Ht 5' 10\" (1.778 m)  Wt 152 lb (68.9 kg)  SpO2 99%  BMI 21.81 kg/m2 Estimated body mass index is 21.81 kg/(m^2) as calculated from the following:    Height as of this encounter: 5' 10\" (1.778 m).    Weight as of this encounter: 152 lb (68.9 kg).  Medication Reconciliation: complete  Indy Toribio M.A.    "

## 2018-09-19 NOTE — PROGRESS NOTES
"SUBJECTIVE:  57 year old.The patient has a history of gerd for many year.  Symptoms include heartburn. Caffeine intake none. Etoh none asa or nsaids some smoking n. Better with omeprazole.  Worse with in the am  Nausea n vomiting n. Endoscopy y  Reviewed health maintenance   Patient Active Problem List   Diagnosis     CARDIOVASCULAR SCREENING; LDL GOAL LESS THAN 160     Dry eye syndrome     TB (tuberculosis), treated     Diagnostic skin and sensitization tests     Seasonal allergic rhinitis     Seasonal allergic conjunctivitis     Allergic rhinitis due to animal dander     Allergy to mold spores     Rhinitis, allergic to other allergen     Esophageal reflux     Advanced directives, counseling/discussion     Cough     Hemoptysis     Pulmonary tuberculosis     Gastroesophageal reflux disease without esophagitis     Snoring     Gastroesophageal reflux disease with esophagitis     ,OBJECTIVE:  no apparent distress  /80  Pulse 61  Temp 97.4  F (36.3  C) (Oral)  Resp 16  Ht 5' 10\" (1.778 m)  Wt 152 lb (68.9 kg)  SpO2 99%  BMI 21.81 kg/m2       OBJECTIVE:  Exam:  Constitutional: healthy, alert and no distress  Head: Normocephalic. No masses, lesions, tenderness or abnormalities  Neck: Neck supple. No adenopathy. Thyroid symmetric, normal size,, Carotids without bruits.  ENT: ENT exam normal, no neck nodes or sinus tenderness  Cardiovascular: negative, PMI normal. No lifts, heaves, or thrills. RRR. No murmurs, clicks gallops or rub  Respiratory: negative\",Good diaphragmatic excursion. Lungs clear  Gastrointestinal: Abdomen soft, non-tender. BS normal. No masses, organomegaly         ICD-10-CM    1. Need for prophylactic vaccination and inoculation against influenza Z23 FLU VACCINE, SPLIT VIRUS, IM (QUADRIVALENT) [85897]- >3 YRS     Vaccine Administration, Initial [16770]   2. Gastroesophageal reflux disease with esophagitis K21.0 omeprazole (PRILOSEC) 40 MG capsule     FLU VACCINE, SPLIT VIRUS, IM " (QUADRIVALENT) [61565]- >3 YRS     Vaccine Administration, Initial [06101]    PLAN:elevate head of bed  No eating and then supine  Follow up in 1 year

## 2018-09-19 NOTE — MR AVS SNAPSHOT
After Visit Summary   9/19/2018    Stacia Abernathy    MRN: [id number]           Patient Information     Date Of Birth          1960        Visit Information        Provider Department      9/19/2018 8:00 AM Juan Jose Hargrove MD [facility]        Today's Diagnoses     Need for prophylactic vaccination and inoculation against influenza    -  1    Gastroesophageal reflux disease with esophagitis        Lipid screening        Screening for diabetes mellitus           Follow-ups after your visit        Future tests that were ordered for you today     Open Future Orders        Priority Expected Expires Ordered    Glucose, whole blood Routine  9/19/2019 9/19/2018    Lipid panel reflex to direct LDL Fasting Routine  9/19/2019 9/19/2018            Who to contact     If you have questions or need follow up information about today's clinic visit or your schedule please contact [facility] directly at [phone].  Normal or non-critical lab and imaging results will be communicated to you by [employer], letter or phone within 4 business days after the clinic has received the results. If you do not hear from us within 7 days, please contact the clinic through [employer] or phone. If you have a critical or abnormal lab result, we will notify you by phone as soon as possible.  Submit refill requests through [employer] or call your pharmacy and they will forward the refill request to us. Please allow 3 business days for your refill to be completed.          Additional Information About Your Visit        [employer] Information     [employer] gives you secure access to your electronic health record. If you see a primary care provider, you can also send messages to your care team and make appointments. If you have questions, please call your primary care clinic.  If you do not have a primary care provider, please call [phone] and they will assist you.        Care EveryWhere ID     This is your Care  "EveryWhere ID. This could be used by other organizations to access your [place] medical records  [id number]        Your Vitals Were     Pulse Temperature Respirations Height Pulse Oximetry BMI (Body Mass Index)    61 97.4  F (36.3  C) (Oral) 16 5' 10\" (1.778 m) 99% 21.81 kg/m2       Blood Pressure from Last 3 Encounters:   09/19/18 133/80   08/14/18 143/90   02/07/18 138/72    Weight from Last 3 Encounters:   09/19/18 152 lb (68.9 kg)   08/14/18 153 lb (69.4 kg)   02/07/18 161 lb 3.2 oz (73.1 kg)              We Performed the Following     FLU VACCINE, SPLIT VIRUS, IM (QUADRIVALENT) [49082]- >3 YRS     Vaccine Administration, Initial [32505]          Today's Medication Changes          These changes are accurate as of 9/19/18  1:13 PM.  If you have any questions, ask your nurse or doctor.               These medicines have changed or have updated prescriptions.        Dose/Directions    omeprazole 40 MG capsule   Commonly known as:  priLOSEC   This may have changed:  Another medication with the same name was removed. Continue taking this medication, and follow the directions you see here.   Used for:  Gastroesophageal reflux disease with esophagitis   Changed by:  Juan Jose Hargrove MD        Dose:  40 mg   Take 1 capsule (40 mg) by mouth daily Take 30-60 minutes before a meal.   Quantity:  90 capsule   Refills:  3            Where to get your medicines      These medications were sent to [facility] [address] [facility] [facility] [address]  [address] [address] [address], [facility] [address]     Phone:  [phone]     omeprazole 40 MG capsule                Primary Care Provider Office Phone # Fax #    Juan Jose Hargrove [phone] [address] [facility] [address]        Equal Access to Services     NAHUN MENDOSA AH: Hadii aad ku hadasho Soomaali, waaxda luqadaha, qaybta kaalmada adeegyada, waxay brice higgins. So wa " [phone].    ATENCIÓN: Si andryla oneal, tiene a renteria disposición servicios gratuitos de asistencia lingüística. Jadiel verma [phone].    We comply with applicable federal civil rights laws and [place] laws. We do not discriminate on the basis of race, color, national origin, age, disability, sex, sexual orientation, or gender identity.            Thank you!     Thank you for [facility] your [facility]. Our goal is always to provide you with excellent care. Hearing back from our patients is one way we can continue to improve our services. Please take a few minutes to complete the written survey that you may receive in the mail after your visit with us. Thank you!             Your Updated Medication List - Protect others around you: Learn how to safely use, store and throw away your medicines at www.disposemymeds.org.          This list is accurate as of 9/19/18  1:13 PM.  Always use your most recent med list.                   Brand Name Dispense Instructions for use Diagnosis    omeprazole 40 MG capsule    priLOSEC    90 capsule    Take 1 capsule (40 mg) by mouth daily Take 30-60 minutes before a meal.    Gastroesophageal reflux disease with esophagitis

## 2018-09-19 NOTE — PROGRESS NOTES

## 2018-12-26 ENCOUNTER — OFFICE VISIT (OUTPATIENT)
Dept: URGENT CARE | Facility: URGENT CARE | Age: 58
End: 2018-12-26
Payer: COMMERCIAL

## 2018-12-26 VITALS
TEMPERATURE: 98.7 F | OXYGEN SATURATION: 96 % | SYSTOLIC BLOOD PRESSURE: 153 MMHG | DIASTOLIC BLOOD PRESSURE: 89 MMHG | BODY MASS INDEX: 22.53 KG/M2 | HEART RATE: 75 BPM | WEIGHT: 157 LBS

## 2018-12-26 DIAGNOSIS — J02.9 SORE THROAT: Primary | ICD-10-CM

## 2018-12-26 DIAGNOSIS — R03.0 ELEVATED BLOOD PRESSURE READING WITHOUT DIAGNOSIS OF HYPERTENSION: ICD-10-CM

## 2018-12-26 LAB
DEPRECATED S PYO AG THROAT QL EIA: NORMAL
SPECIMEN SOURCE: NORMAL

## 2018-12-26 PROCEDURE — 99214 OFFICE O/P EST MOD 30 MIN: CPT | Performed by: FAMILY MEDICINE

## 2018-12-26 PROCEDURE — 87880 STREP A ASSAY W/OPTIC: CPT | Performed by: FAMILY MEDICINE

## 2018-12-26 PROCEDURE — 87081 CULTURE SCREEN ONLY: CPT | Performed by: FAMILY MEDICINE

## 2018-12-26 NOTE — NURSING NOTE
"Chief Complaint   Patient presents with     Cough     c/o cough, headache, sore throat, congestion and sneezing x 2 days       Initial There were no vitals taken for this visit. Estimated body mass index is 21.81 kg/m  as calculated from the following:    Height as of 9/19/18: 1.778 m (5' 10\").    Weight as of 9/19/18: 68.9 kg (152 lb).  Medication Reconciliation: complete  Patient and/or MA were masked during rooming process  Anthony Butler MA        "

## 2018-12-26 NOTE — PROGRESS NOTES
Chief complaint: sore throat    Yesterday morning started with coughing and sneezing and watery eyes  Slight headache  Dry throat pain    BP elevated today but asymptomatic. No headache no blurring of vision no chest pain no shortness of breath no dizziness no unsteadiness no numbness no weakness  fever  - No  cough  -Yes  ill contacts - No  able to swallow liquids and solids -YES  other symptoms above  Rash: No  Has tried over the counter medications no relief  because of persistence, patient came in to be seen.    ROS:  denies any exertional chest pain or shortness of breath  denies any unusual rash or joint swelling  denies post-tussive emesis or pertussis like symptoms  Negative for constitutional, eye, ear, nose, throat, skin, respiratory, cardiac, and gastrointestinal other than those outlined in the HPI.    PMH: chart reviewed  FH: chart reviewed    SH: chart reviewed and as above   Physical Exam:   /89   Pulse 75   Temp 98.7  F (37.1  C)   Wt 71.2 kg (157 lb)   SpO2 96%   BMI 22.53 kg/m    General : Awake Alert not in any acute cardiorespiratory distress  Head:       Normocephalic Atraumatic  Eyes:    Pupils equally reactive to light and accomodation. Sclera not icteric.   ENT:   midline nasal septum, mild nasal congestion, sinuses non-tender  left ear: no tragal tenderness, no mastoid tenderness, normal EAC, normal TM  right ear: left ear: no tragal tenderness, no mastoid tenderness, normal EAC, normal TM  mouth moist buccal mucosa, Yes hyperemic posterior pharyngeal wall, no trismus  tonsils: tonsils are present and normal  anterior cervical nodes: No tender  posterior cervical nodes: No  palpable  Heart:  Regular in rate and rhythm, no murmurs rubs or gallops  Lungs: Symmetrical Chest Expansion, no retractions, clear breath sounds  Abdomen: soft, no hepatosplenomegally  Psych: Appropriate mood and affect. Pleasant  Skin: patient undressed to level of his/her comfort. No visible concerning  lesions.    Labs: Strep negative     ICD-10-CM    1. Sore throat J02.9 Rapid strep screen     Beta strep group A culture   2. Elevated blood pressure reading without diagnosis of hypertension R03.0        Suspect viral at this time   supportive treatment: advised supportive treatment, Advised to come back in if with any worsening symptoms or if not better despite supportive measures. Especially if with any worsening sore throat, inability to eat or drink or swallow, or trismus. Symptoms of peritonsillar abscess discussed. Patient voiced understanding.adverse reactions of medication discussed  OTC medications discussed  advised to come back in right away if with any worsening symptoms or if with no relief despite treatment plan  patient voiced understanding and had no further questions at this time.    Your blood pressure reading was elevated today.  Recommend that you have it re-checked either at home or at our clinic within a week  Avoid cold medicines that have pseudoephedrin in it, or Sudafed. You may take regular or plain Robitussin or Mucinex  If you are unsure, please ask the pharmacist    If your blood pressure top number (systolic) 180 and above, or the bottom number (diastolic) 120 and above, you need to be seen immediately.  If persistently elevated top number 140 and above, or the bottom number 90 and above, please schedule an appointment to see a provider in clinic within a week.  If you have very elevated blood pressures, accompanied by headache, chest pain, numbness, weakness, slurring of speech, confusion, difficulty walking, call 911 and go to the ER

## 2018-12-26 NOTE — LETTER
[facility]  [address] [facility] [address]  Phone: [phone]    December 26, 2018        Stacia Abernathy  [address] [facility] [address]          To whom it may concern:    RE: Stacia Abernathy    Patient was seen and treated today at our clinic and missed work.    Please contact me for questions or concerns.      Sincerely,        Columba Slater MD

## 2018-12-27 LAB
BACTERIA SPEC CULT: NORMAL
SPECIMEN SOURCE: NORMAL

## 2019-09-24 ENCOUNTER — MYC MEDICAL ADVICE (OUTPATIENT)
Dept: FAMILY MEDICINE | Facility: CLINIC | Age: 59
End: 2019-09-24

## 2019-09-26 ENCOUNTER — TELEPHONE (OUTPATIENT)
Dept: FAMILY MEDICINE | Facility: CLINIC | Age: 59
End: 2019-09-26

## 2019-09-26 DIAGNOSIS — Z13.6 CARDIOVASCULAR SCREENING; LDL GOAL LESS THAN 160: Primary | ICD-10-CM

## 2019-09-26 DIAGNOSIS — Z13.1 SCREENING FOR DIABETES MELLITUS: ICD-10-CM

## 2019-09-26 NOTE — TELEPHONE ENCOUNTER
Please review and sign Pending Pre-visit Labs in [facility].  Labs 11/15/19 and JOSH on 11/20/19  Melba MONTES

## 2019-09-26 NOTE — TELEPHONE ENCOUNTER
Reason for Call:  Other appointment    Detailed comments: Patient has appt to see Dr. Hargrove on 1/20/19 and would like orders put in for pre appt labs. Has a lab appointment on 11/15/19 Thank you    Phone Number Patient can be reached at: Home number on file [phone] (home)    Best Time: Any    Can we leave a detailed message on this number? YES    Call taken on 9/26/2019 at 1:56 PM by Malika Lopez

## 2019-11-15 DIAGNOSIS — Z13.1 SCREENING FOR DIABETES MELLITUS: ICD-10-CM

## 2019-11-15 DIAGNOSIS — Z13.6 CARDIOVASCULAR SCREENING; LDL GOAL LESS THAN 160: ICD-10-CM

## 2019-11-15 LAB
CHOLEST SERPL-MCNC: 222 MG/DL
GLUCOSE BLD-MCNC: 90 MG/DL (ref 70–99)
HDLC SERPL-MCNC: 46 MG/DL
LDLC SERPL CALC-MCNC: 135 MG/DL
NONHDLC SERPL-MCNC: 176 MG/DL
TRIGL SERPL-MCNC: 205 MG/DL

## 2019-11-15 PROCEDURE — 80061 LIPID PANEL: CPT | Performed by: FAMILY MEDICINE

## 2019-11-15 PROCEDURE — 82947 ASSAY GLUCOSE BLOOD QUANT: CPT | Performed by: FAMILY MEDICINE

## 2019-11-15 PROCEDURE — 36415 COLL VENOUS BLD VENIPUNCTURE: CPT | Performed by: FAMILY MEDICINE

## 2019-11-20 ENCOUNTER — OFFICE VISIT (OUTPATIENT)
Dept: FAMILY MEDICINE | Facility: CLINIC | Age: 59
End: 2019-11-20
Payer: COMMERCIAL

## 2019-11-20 VITALS
RESPIRATION RATE: 16 BRPM | OXYGEN SATURATION: 97 % | DIASTOLIC BLOOD PRESSURE: 84 MMHG | WEIGHT: 159 LBS | HEIGHT: 70 IN | SYSTOLIC BLOOD PRESSURE: 134 MMHG | HEART RATE: 82 BPM | BODY MASS INDEX: 22.76 KG/M2

## 2019-11-20 DIAGNOSIS — K21.00 GASTROESOPHAGEAL REFLUX DISEASE WITH ESOPHAGITIS: ICD-10-CM

## 2019-11-20 DIAGNOSIS — J30.1 ALLERGIC RHINITIS DUE TO POLLEN, UNSPECIFIED SEASONALITY: ICD-10-CM

## 2019-11-20 DIAGNOSIS — Z00.00 ROUTINE GENERAL MEDICAL EXAMINATION AT A HEALTH CARE FACILITY: Primary | ICD-10-CM

## 2019-11-20 DIAGNOSIS — E78.00 HIGH CHOLESTEROL: ICD-10-CM

## 2019-11-20 PROCEDURE — 99396 PREV VISIT EST AGE 40-64: CPT | Performed by: FAMILY MEDICINE

## 2019-11-20 RX ORDER — FLUTICASONE PROPIONATE 50 MCG
2 SPRAY, SUSPENSION (ML) NASAL
COMMUNITY
Start: 2017-09-22 | End: 2019-11-20

## 2019-11-20 RX ORDER — ATORVASTATIN CALCIUM 10 MG/1
10 TABLET, FILM COATED ORAL DAILY
Qty: 60 TABLET | Refills: 0 | Status: SHIPPED | OUTPATIENT
Start: 2019-11-20 | End: 2020-10-07

## 2019-11-20 RX ORDER — FLUTICASONE PROPIONATE 50 MCG
2 SPRAY, SUSPENSION (ML) NASAL DAILY
Qty: 17 G | Refills: 3 | Status: SHIPPED | OUTPATIENT
Start: 2019-11-20 | End: 2023-09-13

## 2019-11-20 RX ORDER — OMEPRAZOLE 40 MG/1
40 CAPSULE, DELAYED RELEASE ORAL DAILY
Qty: 90 CAPSULE | Refills: 3 | Status: SHIPPED | OUTPATIENT
Start: 2019-11-20 | End: 2020-10-07

## 2019-11-20 ASSESSMENT — ENCOUNTER SYMPTOMS
ARTHRALGIAS: 1
HEARTBURN: 1

## 2019-11-20 ASSESSMENT — MIFFLIN-ST. JEOR: SCORE: 1542.47

## 2019-11-20 NOTE — PROGRESS NOTES
"  3  SUBJECTIVE:   CC: Stacia Abernathy is an 59 year old male who presents for preventive health visit.     Healthy Habits:    Do you get at least three servings of calcium containing foods daily (dairy, green leafy vegetables, etc.)? { :890150::\"yes\"}    Amount of exercise or daily activities, outside of work: { :181562}    Problems taking medications regularly { :634935::\"No\"}    Medication side effects: { :988544::\"No\"}    Have you had an eye exam in the past two years? { :699280}    Do you see a dentist twice per year? { :666378}    Do you have sleep apnea, excessive snoring or daytime drowsiness?{ :731617}  {Outside tests to abstract? :688682}    {additional problems to add (Optional):015195}    Today's PHQ-2 Score:   PHQ-2 (  [employer]) 2018   Q1: Little interest or pleasure in doing things 0 0   Q2: Feeling down, depressed or hopeless 0 0   PHQ-2 Score 0 0   Q1: Little interest or pleasure in doing things - Not at all   Q2: Feeling down, depressed or hopeless - Not at all   PHQ-2 Score - 0     {PHQ-2 LOOK IN ASSESSMENTS (Optional) :204685}  Abuse: Current or Past(Physical, Sexual or Emotional)- {YES/NO/NA:657363}  Do you feel safe in your environment? {YES/NO/NA:705459}        Social History     Tobacco Use     Smoking status: Former Smoker     Last attempt to quit: 2008     Years since quittin.1     Smokeless tobacco: Never Used   Substance Use Topics     Alcohol use: Yes     Comment: OCC     If you drink alcohol do you typically have >3 drinks per day or >7 drinks per week? {ETOH :904744}                      Last PSA: No results found for: PSA    Reviewed orders with patient. Reviewed health maintenance and updated orders accordingly - {Yes/No:702287::\"Yes\"}  {Chronicprobdata (Optional):431068}    Reviewed and updated as needed this visit by clinical staff         Reviewed and updated as needed this visit by Provider        {HISTORY OPTIONS (Optional):452412}    ROS:  { " ":290432::\"CONSTITUTIONAL: NEGATIVE for fever, chills, change in weight\",\"INTEGUMENTARY/SKIN: NEGATIVE for worrisome rashes, moles or lesions\",\"EYES: NEGATIVE for vision changes or irritation\",\"ENT: NEGATIVE for ear, mouth and throat problems\",\"RESP: NEGATIVE for significant cough or SOB\",\"CV: NEGATIVE for chest pain, palpitations or peripheral edema\",\"GI: NEGATIVE for nausea, abdominal pain, heartburn, or change in bowel habits\",\" male: negative for dysuria, hematuria, decreased urinary stream, erectile dysfunction, urethral discharge\",\"MUSCULOSKELETAL: NEGATIVE for significant arthralgias or myalgia\",\"NEURO: NEGATIVE for weakness, dizziness or paresthesias\",\"PSYCHIATRIC: NEGATIVE for changes in mood or affect\"}    OBJECTIVE:   There were no vitals taken for this visit.  EXAM:  {Exam Choices:858455}    {Diagnostic Test Results (Optional):972124::\"Diagnostic Test Results:\",\"Labs reviewed in Epic\"}    ASSESSMENT/PLAN:   {Diag Picklist:092331}    COUNSELING:  {MALE COUNSELING MESSAGES:847811::\"Reviewed preventive health counseling, as reflected in patient instructions\"}    Estimated body mass index is 22.53 kg/m  as calculated from the following:    Height as of 9/19/18: 1.778 m (5' 10\").    Weight as of 12/26/18: 71.2 kg (157 lb).    {Weight Management Plan (ACO) Complete if BMI is abnormal-  Ages 18-64  BMI >24.9.  Age 65+ with BMI <23 or >30 (Optional):816891}     reports that he quit smoking about 11 years ago. He has never used smokeless tobacco.  {Tobacco Cessation -- Complete if patient is a smoker (Optional):431528}    Counseling Resources:  ATP IV Guidelines  Pooled Cohorts Equation Calculator  FRAX Risk Assessment  ICSI Preventive Guidelines  Dietary Guidelines for Americans, 2010  USDA's MyPlate  ASA Prophylaxis  Lung CA Screening    Juan Jose Hargrove MD  [facility]  "

## 2019-11-20 NOTE — PROGRESS NOTES
SUBJECTIVE:   CC: Stacia Abernathy is an 59 year old male who presents for preventative health visit.     HPI        Would like to discuss recent lab results as they were slightly elevated   Will start on [id number]    Today's PHQ-2 Score:   PHQ-2 (  [employer]) 2019   Q1: Little interest or pleasure in doing things -   Q2: Feeling down, depressed or hopeless -   PHQ-2 Score -   Q1: Little interest or pleasure in doing things -   Q2: Feeling down, depressed or hopeless -   PHQ-2 Score Incomplete       Abuse: Current or Past(Physical, Sexual or Emotional)- No  Do you feel safe in your environment? Yes        Social History     Tobacco Use     Smoking status: Former Smoker     Last attempt to quit: 2008     Years since quittin.1     Smokeless tobacco: Never Used   Substance Use Topics     Alcohol use: Yes     Comment: OCC         Alcohol Use 2017   Prescreen: >3 drinks/day or >7 drinks/week? The patient does not drink >3 drinks per day nor >7 drinks per week.       Last PSA: No results found for: PSA    Reviewed orders with patient. Reviewed health maintenance and updated orders accordingly - Yes  Allergies seasonal      Reviewed and updated as needed this visit by clinical staff  Tobacco  Allergies  Meds         Reviewed and updated as needed this visit by Provider            Review of Systems  CONSTITUTIONAL: NEGATIVE for fever, chills, change in weight  INTEGUMENTARY/SKIN: NEGATIVE for worrisome rashes, moles or lesions  EYES: NEGATIVE for vision changes or irritation  ENT: NEGATIVE for ear, mouth and throat problems  RESP: NEGATIVE for significant cough or SOB  CV: NEGATIVE for chest pain, palpitations or peripheral edema  GI: NEGATIVE for nausea, abdominal pain, heartburn, or change in bowel habits   male: negative for dysuria, hematuria, decreased urinary stream, erectile dysfunction, urethral discharge  MUSCULOSKELETAL: NEGATIVE for significant arthralgias or myalgia  NEURO: NEGATIVE for  "weakness, dizziness or paresthesias  PSYCHIATRIC: NEGATIVE for changes in mood or affect    OBJECTIVE:   /84   Pulse 82   Resp 16   Ht 1.778 m (5' 10\")   Wt 72.1 kg (159 lb)   SpO2 97%   BMI 22.81 kg/m      Physical Exam  GENERAL: healthy, alert and no distress  EYES: Eyes grossly normal to inspection, PERRL and conjunctivae and sclerae normal  HENT: ear canals and TM's normal, nose and mouth without ulcers or lesions  NECK: no adenopathy, no asymmetry, masses, or scars and thyroid normal to palpation  RESP: lungs clear to auscultation - no rales, rhonchi or wheezes  CV: regular rate and rhythm, normal S1 S2, no S3 or S4, no murmur, click or rub, no peripheral edema and peripheral pulses strong  ABDOMEN: soft, nontender, no hepatosplenomegaly, no masses and bowel sounds normal  MS: no gross musculoskeletal defects noted, no edema  SKIN: no suspicious lesions or rashes  NEURO: Normal strength and tone, mentation intact and speech normal  PSYCH: mentation appears normal, affect normal/bright    Diagnostic Test Results:  Labs reviewed in Epic  The 10-year ASCVD risk score (Rajni PINON Jr., et al., 2013) is: 10.1%    Values used to calculate the score:      Age: 59 years      Sex: Male      Is Non- : No      Diabetic: No      Tobacco smoker: No      Systolic Blood Pressure: 134 mmHg      Is BP treated: No      HDL Cholesterol: 46 mg/dL      Total Cholesterol: 222 mg/dL      ASSESSMENT/PLAN:       ICD-10-CM    1. Routine general medical examination at a health care facility Z00.00    2. Gastroesophageal reflux disease with esophagitis K21.0 omeprazole (PRILOSEC) 40 MG DR capsule   3. Allergic rhinitis due to pollen, unspecified seasonality J30.1 fluticasone (FLONASE) 50 MCG/ACT nasal spray   4. High cholesterol E78.00 atorvastatin (LIPITOR) 10 MG tablet     Lipid Profile (Chol, Trig, HDL, LDL calc)       COUNSELING:   Reviewed preventive health counseling, as reflected in patient " "instructions       Regular exercise       Healthy diet/nutrition    Estimated body mass index is 22.81 kg/m  as calculated from the following:    Height as of this encounter: 1.778 m (5' 10\").    Weight as of this encounter: 72.1 kg (159 lb).          reports that he quit smoking about 11 years ago. He has never used smokeless tobacco.      Counseling Resources:  ATP IV Guidelines  Pooled Cohorts Equation Calculator  FRAX Risk Assessment  ICSI Preventive Guidelines  Dietary Guidelines for Americans, 2010  USDA's MyPlate  ASA Prophylaxis  Lung CA Screening    Juan Jose Hargrove MD  [facility]  "

## 2019-11-20 NOTE — PATIENT INSTRUCTIONS
Preventive Health Recommendations  Male Ages 50 - 64    Yearly exam:             See your health care provider every year in order to  o   Review health changes.   o   Discuss preventive care.    o   Review your medicines if your doctor has prescribed any.     Have a cholesterol test every 5 years, or more frequently if you are at risk for high cholesterol/heart disease.     Have a diabetes test (fasting glucose) every three years. If you are at risk for diabetes, you should have this test more often.     Have a colonoscopy at age 50, or have a yearly FIT test (stool test). These exams will check for colon cancer.      Talk with your health care provider about whether or not a prostate cancer screening test (PSA) is right for you.    You should be tested each year for STDs (sexually transmitted diseases), if you re at risk.     Shots: Get a flu shot each year. Get a tetanus shot every 10 years.     Nutrition:    Eat at least 5 servings of fruits and vegetables daily.     Eat whole-grain bread, whole-wheat pasta and brown rice instead of white grains and rice.     Get adequate Calcium and Vitamin D.     Lifestyle    Exercise for at least 150 minutes a week (30 minutes a day, 5 days a week). This will help you control your weight and prevent disease.     Limit alcohol to one drink per day.     No smoking.     Wear sunscreen to prevent skin cancer.     See your dentist every six months for an exam and cleaning.     See your eye doctor every 1 to 2 years.  Follow up in 2 months for blood work to check for cholesterol

## 2020-01-30 DIAGNOSIS — E78.00 HIGH CHOLESTEROL: ICD-10-CM

## 2020-01-30 LAB
CHOLEST SERPL-MCNC: 149 MG/DL
HDLC SERPL-MCNC: 46 MG/DL
LDLC SERPL CALC-MCNC: 82 MG/DL
NONHDLC SERPL-MCNC: 103 MG/DL
TRIGL SERPL-MCNC: 106 MG/DL

## 2020-01-30 PROCEDURE — 80061 LIPID PANEL: CPT | Performed by: FAMILY MEDICINE

## 2020-01-30 PROCEDURE — 36415 COLL VENOUS BLD VENIPUNCTURE: CPT | Performed by: FAMILY MEDICINE

## 2020-08-23 ENCOUNTER — TRANSFERRED RECORDS (OUTPATIENT)
Dept: HEALTH INFORMATION MANAGEMENT | Facility: CLINIC | Age: 60
End: 2020-08-23

## 2020-10-06 NOTE — PROGRESS NOTES
3  SUBJECTIVE:   CC: Stacia Abernathy is an 59 year old male who presents for preventive health visit.     Patient has been advised of split billing requirements and indicates understanding: Yes     Healthy Habits:    Answers for HPI/ROS submitted by the patient on 10/7/2020   Annual Exam:  Frequency of exercise:: 2-3 days/week  Getting at least 3 servings of Calcium per day:: Yes  Diet:: Regular (no restrictions)  Taking medications regularly:: Yes  Medication side effects:: None  Bi-annual eye exam:: Yes  Dental care twice a year:: NO  Sleep apnea or symptoms of sleep apnea:: Excessive snoring  abdominal pain: No  Blood in stool: No  Blood in urine: No  chest pain: No  chills: No  congestion: Yes  constipation: No  cough: No  diarrhea: No  dizziness: No  ear pain: No  eye pain: No  nervous/anxious: No  Additional concerns today:: No  Duration of exercise:: Less than 15 minutes      SUBJECTIVE:  59 year old.The patient has a complaint of rash.  This started 7 months ago. Location scalp quality itches Associated symptoms are flaky .  Brought on by unknown .  Better with nothing and has tried head and shoulders.     Reviewed health maintenance  Patient Active Problem List   Diagnosis     CARDIOVASCULAR SCREENING; LDL GOAL LESS THAN 160     Dry eye syndrome     TB (tuberculosis), treated     Diagnostic skin and sensitization tests     Seasonal allergic rhinitis     Seasonal allergic conjunctivitis     Allergic rhinitis due to animal dander     Allergy to mold spores     Rhinitis, allergic to other allergen     Esophageal reflux     Advanced directives, counseling/discussion     Cough     Hemoptysis     Pulmonary tuberculosis     Gastroesophageal reflux disease without esophagitis     Snoring     Gastroesophageal reflux disease with esophagitis     Past Medical History:   Diagnosis Date     Allergic rhinitis due to animal dander      Allergy to mold spores     6/11/12 skin tests pos. for:  cat/dog/CR/DM/M/T/G/W     Bell's  "palsy      Diagnostic skin and sensitization tests 6/11/12 skin tests pos. for:  cat/dog/CR/DM/M/T/G/W     Need for desensitization to allergens IT 6/12 to 8/13 only-stopped by pt, no hx of rxn--IT for cat/dog/DM/M/T/G/W      Rhinitis, allergic to other allergen      Seasonal allergic conjunctivitis      Seasonal allergic rhinitis        OBJECTIVE:  no apparent distress  /86   Pulse 58   Temp 96.5  F (35.8  C) (Tympanic)   Resp 14   Ht 1.778 m (5' 10\")   Wt 71.2 kg (157 lb)   SpO2 99%   BMI 22.53 kg/m    Scalp with flaky rash     ICD-10-CM    1. Routine general medical examination at a health care facility  Z00.00    2. High cholesterol  E78.00 atorvastatin (LIPITOR) 10 MG tablet    PLAN: nizoral shampoo      SUBJECTIVE:  59 year old enters for recheck of high cholesterol.  Pt. Has been taking med and has no side effects. Pt is following diet.  Denies chest pain and SOB.  Past Medical History:   Diagnosis Date     Allergic rhinitis due to animal dander      Allergy to mold spores     6/11/12 skin tests pos. for:  cat/dog/CR/DM/M/T/G/W     Bell's palsy      Diagnostic skin and sensitization tests 6/11/12 skin tests pos. for:  cat/dog/CR/DM/M/T/G/W     Need for desensitization to allergens IT 6/12 to 8/13 only-stopped by pt, no hx of rxn--IT for cat/dog/DM/M/T/G/W      Rhinitis, allergic to other allergen      Seasonal allergic conjunctivitis      Seasonal allergic rhinitis      Past Surgical History:   Procedure Laterality Date     COLONOSCOPY  3/4/2013    Procedure: COLONOSCOPY;  screening colonoscopy,;  Surgeon: Tai Monroe MD;  Location:  OR     ESOPHAGOSCOPY, GASTROSCOPY, DUODENOSCOPY (EGD), COMBINED N/A 11/3/2014    Procedure: COMBINED ESOPHAGOSCOPY, GASTROSCOPY, DUODENOSCOPY (EGD), BIOPSY SINGLE OR MULTIPLE;  Surgeon: Renato Vasquez MD;  Location:  OR       Current Outpatient Medications:      atorvastatin (LIPITOR) 10 MG tablet, Take 1 tablet (10 mg) by mouth daily, Disp: 90 " "tablet, Rfl: 1     fluticasone (FLONASE) 50 MCG/ACT nasal spray, Spray 2 sprays in nostril daily, Disp: 17 g, Rfl: 3     omeprazole (PRILOSEC) 40 MG DR capsule, Take 1 capsule (40 mg) by mouth daily Take 30-60 minutes before a meal., Disp: 90 capsule, Rfl: 3  Reviewed health maintenance   Patient Active Problem List   Diagnosis     CARDIOVASCULAR SCREENING; LDL GOAL LESS THAN 160     Dry eye syndrome     TB (tuberculosis), treated     Diagnostic skin and sensitization tests     Seasonal allergic rhinitis     Seasonal allergic conjunctivitis     Allergic rhinitis due to animal dander     Allergy to mold spores     Rhinitis, allergic to other allergen     Esophageal reflux     Advanced directives, counseling/discussion     Cough     Hemoptysis     Pulmonary tuberculosis     Gastroesophageal reflux disease without esophagitis     Snoring     Gastroesophageal reflux disease with esophagitis       OBJECTIVE:  no apparent distress  /86   Pulse 58   Temp 96.5  F (35.8  C) (Tympanic)   Resp 14   Ht 1.778 m (5' 10\")   Wt 71.2 kg (157 lb)   SpO2 99%   BMI 22.53 kg/m        Exam:  Constitutional: healthy, alert and no distress  Head: Normocephalic. No masses, lesions, tenderness or abnormalities  Neck: Neck supple. No adenopathy. Thyroid symmetric, normal size,  Cardiovascular: negative, PMI normal. No lifts, heaves, or thrills. RRR. No murmurs, clicks gallops or rub  Respiratory: negative Lungs clear    Orders Only on 01/30/2020   Component Date Value Ref Range Status     Cholesterol 01/30/2020 149  <200 mg/dL Final     Triglycerides 01/30/2020 106  <150 mg/dL Final    Fasting specimen     HDL Cholesterol 01/30/2020 46  >39 mg/dL Final     LDL Cholesterol Calculated 01/30/2020 82  <100 mg/dL Final    Desirable:       <100 mg/dl     Non HDL Cholesterol 01/30/2020 103  <130 mg/dL Final           ICD-10-CM    1. Routine general medical examination at a health care facility  Z00.00    2. High cholesterol  E78.00 " atorvastatin (LIPITOR) 10 MG tablet   3. Seborrheic dermatitis  L21.9    4. Gastroesophageal reflux disease with esophagitis  K21.00    5. Gastroesophageal reflux disease without esophagitis  K21.9 omeprazole (PRILOSEC) 40 MG DR capsule    PLAN: start after finishing nizoral   Today's PHQ-2 Score:   PHQ-2 (  [employer]) 10/7/2020 2019   Q1: Little interest or pleasure in doing things 0 0   Q2: Feeling down, depressed or hopeless 0 0   PHQ-2 Score 0 0   Q1: Little interest or pleasure in doing things Not at all Not at all   Q2: Feeling down, depressed or hopeless Not at all Not at all   PHQ-2 Score 0 0       Abuse: Current or Past(Physical, Sexual or Emotional)- No  Do you feel safe in your environment? Yes        Social History     Tobacco Use     Smoking status: Former Smoker     Quit date: 2008     Years since quittin.0     Smokeless tobacco: Never Used   Substance Use Topics     Alcohol use: Yes     Comment: OCC     If you drink alcohol do you typically have >3 drinks per day or >7 drinks per week? No                      Last PSA: No results found for: PSA    Reviewed orders with patient. Reviewed health maintenance and updated orders accordingly - Yes       Reviewed and updated as needed this visit by clinical staff  Tobacco  Allergies  Meds   Med Hx  Surg Hx  Fam Hx  Soc Hx        Reviewed and updated as needed this visit by Provider                    ROS:  CONSTITUTIONAL: NEGATIVE for fever, chills, change in weight  INTEGUMENTARY/SKIN: NEGATIVE for worrisome rashes, moles or lesions  EYES: NEGATIVE for vision changes or irritation  ENT: NEGATIVE for ear, mouth and throat problems  RESP: NEGATIVE for significant cough or SOB  CV: NEGATIVE for chest pain, palpitations or peripheral edema  GI: NEGATIVE for nausea, abdominal pain, heartburn, or change in bowel habits   male: negative for dysuria, hematuria, decreased urinary stream, erectile dysfunction, urethral  "discharge  MUSCULOSKELETAL: NEGATIVE for significant arthralgias or myalgia  NEURO: NEGATIVE for weakness, dizziness or paresthesias  PSYCHIATRIC: NEGATIVE for changes in mood or affect    OBJECTIVE:   /86   Pulse 58   Temp 96.5  F (35.8  C) (Tympanic)   Resp 14   Ht 1.778 m (5' 10\")   Wt 71.2 kg (157 lb)   SpO2 99%   BMI 22.53 kg/m    EXAM:  GENERAL: healthy, alert and no distress  EYES: Eyes grossly normal to inspection, PERRL and conjunctivae and sclerae normal  HENT: ear canals and TM's normal, nose and mouth without ulcers or lesions  NECK: no adenopathy, no asymmetry, masses, or scars and thyroid normal to palpation  RESP: lungs clear to auscultation - no rales, rhonchi or wheezes  CV: regular rate and rhythm, normal S1 S2, no S3 or S4, no murmur, click or rub, no peripheral edema and peripheral pulses strong  ABDOMEN: soft, nontender, no hepatosplenomegaly, no masses and bowel sounds normal  MS: no gross musculoskeletal defects noted, no edema  SKIN: no suspicious lesions or rashes  NEURO: Normal strength and tone, mentation intact and speech normal  PSYCH: mentation appears normal, affect normal/bright    Diagnostic Test Results:  Labs reviewed in Epic    ASSESSMENT/PLAN:       ICD-10-CM    1. Routine general medical examination at a health care facility  Z00.00    2. High cholesterol  E78.00 atorvastatin (LIPITOR) 10 MG tablet   3. Seborrheic dermatitis  L21.9    4. Gastroesophageal reflux disease with esophagitis  K21.00    5. Gastroesophageal reflux disease without esophagitis  K21.9 omeprazole (PRILOSEC) 40 MG DR capsule       Patient has been advised of split billing requirements and indicates understanding: Yes  COUNSELING:  Reviewed preventive health counseling, as reflected in patient instructions    Estimated body mass index is 22.53 kg/m  as calculated from the following:    Height as of this encounter: 1.778 m (5' 10\").    Weight as of this encounter: 71.2 kg (157 lb).        He " reports that he quit smoking about 12 years ago. He has never used smokeless tobacco.      Counseling Resources:  ATP IV Guidelines  Pooled Cohorts Equation Calculator  FRAX Risk Assessment  ICSI Preventive Guidelines  Dietary Guidelines for Americans, 2010  USDA's MyPlate  ASA Prophylaxis  Lung CA Screening    Juan Jose Hargrove MD  [facility]

## 2020-10-07 ENCOUNTER — TELEPHONE (OUTPATIENT)
Dept: FAMILY MEDICINE | Facility: CLINIC | Age: 60
End: 2020-10-07

## 2020-10-07 ENCOUNTER — OFFICE VISIT (OUTPATIENT)
Dept: FAMILY MEDICINE | Facility: CLINIC | Age: 60
End: 2020-10-07
Payer: COMMERCIAL

## 2020-10-07 VITALS
SYSTOLIC BLOOD PRESSURE: 134 MMHG | HEIGHT: 70 IN | WEIGHT: 157 LBS | RESPIRATION RATE: 14 BRPM | OXYGEN SATURATION: 99 % | BODY MASS INDEX: 22.48 KG/M2 | TEMPERATURE: 96.5 F | HEART RATE: 58 BPM | DIASTOLIC BLOOD PRESSURE: 86 MMHG

## 2020-10-07 DIAGNOSIS — L21.9 SEBORRHEIC DERMATITIS: Primary | ICD-10-CM

## 2020-10-07 DIAGNOSIS — E78.00 HIGH CHOLESTEROL: ICD-10-CM

## 2020-10-07 DIAGNOSIS — K21.9 GASTROESOPHAGEAL REFLUX DISEASE WITHOUT ESOPHAGITIS: ICD-10-CM

## 2020-10-07 DIAGNOSIS — Z00.00 ROUTINE GENERAL MEDICAL EXAMINATION AT A HEALTH CARE FACILITY: Primary | ICD-10-CM

## 2020-10-07 DIAGNOSIS — L21.9 SEBORRHEIC DERMATITIS: ICD-10-CM

## 2020-10-07 PROCEDURE — 99396 PREV VISIT EST AGE 40-64: CPT | Performed by: FAMILY MEDICINE

## 2020-10-07 PROCEDURE — 99213 OFFICE O/P EST LOW 20 MIN: CPT | Mod: 25 | Performed by: FAMILY MEDICINE

## 2020-10-07 RX ORDER — ATORVASTATIN CALCIUM 10 MG/1
10 TABLET, FILM COATED ORAL DAILY
Qty: 90 TABLET | Refills: 1 | Status: SHIPPED | OUTPATIENT
Start: 2020-10-07 | End: 2021-04-05

## 2020-10-07 RX ORDER — OMEPRAZOLE 40 MG/1
40 CAPSULE, DELAYED RELEASE ORAL DAILY
Qty: 90 CAPSULE | Refills: 3 | Status: SHIPPED | OUTPATIENT
Start: 2020-10-07 | End: 2021-11-03

## 2020-10-07 ASSESSMENT — ENCOUNTER SYMPTOMS
HEMATURIA: 0
HEMATOCHEZIA: 0
CONSTIPATION: 0
DIZZINESS: 0
CHILLS: 0
EYE PAIN: 0
NERVOUS/ANXIOUS: 0
DIARRHEA: 0
ABDOMINAL PAIN: 0
COUGH: 0

## 2020-10-07 ASSESSMENT — MIFFLIN-ST. JEOR: SCORE: 1533.4

## 2020-10-07 NOTE — TELEPHONE ENCOUNTER
David Bone PA-C:  Dr. Juan Jose Hargrove saw patient today.  Note states scalp rash; no relief head and shoulders.  PLAN: froilan Hargrove forgot to send an prescription to his pharmacy.  I pended one but need directions.  Can you please send for him.  MD is not available to advise.  Perri Mary RN

## 2020-10-07 NOTE — TELEPHONE ENCOUNTER
Patient is calling stating that an rx for shampoo was not sent to the pharmacy.  Please call to advise.  Thank you

## 2020-10-08 RX ORDER — KETOCONAZOLE 20 MG/ML
SHAMPOO TOPICAL DAILY PRN
Qty: 120 ML | Refills: 0 | Status: SHIPPED | OUTPATIENT
Start: 2020-10-08 | End: 2021-06-09

## 2021-04-04 DIAGNOSIS — E78.00 HIGH CHOLESTEROL: ICD-10-CM

## 2021-04-04 NOTE — LETTER
April 5, 2021    Stacia Abernathy  [address] [facility] [id number]    Dear Stacia,       We recently received a refill request for atorvastatin (LIPITOR) 10 MG tablet.  We have refilled this for a one time 30 day supply only because you are due for a:    Medication check office visit and fasting lab appointment      Please schedule this lab appointment 4-5 days prior to the office visit.     Please call at your earliest convenience so that there will not be a delay with your future refills.          Thank you,   Your [facility] Team/  [phone]

## 2021-04-05 RX ORDER — ATORVASTATIN CALCIUM 10 MG/1
TABLET, FILM COATED ORAL
Qty: 30 TABLET | Refills: 0 | Status: SHIPPED | OUTPATIENT
Start: 2021-04-05 | End: 2021-05-05

## 2021-04-05 NOTE — TELEPHONE ENCOUNTER
Routing refill request to provider for review/approval because:  Labs not current:  LDL    Cindi Abad RN

## 2021-05-05 DIAGNOSIS — E78.00 HIGH CHOLESTEROL: ICD-10-CM

## 2021-05-05 RX ORDER — ATORVASTATIN CALCIUM 10 MG/1
TABLET, FILM COATED ORAL
Qty: 30 TABLET | Refills: 0 | Status: SHIPPED | OUTPATIENT
Start: 2021-05-05 | End: 2021-06-03

## 2021-05-05 NOTE — TELEPHONE ENCOUNTER
Routing refill request to provider for review/approval because:  Cathy given x1 and patient did not follow up, please advise  Lab Results   Component Value Date    CHOL 149 01/30/2020     Lab Results   Component Value Date    HDL 46 01/30/2020     Lab Results   Component Value Date    LDL 82 01/30/2020     Lab Results   Component Value Date    TRIG 106 01/30/2020     Lab Results   Component Value Date    CHOLHDLRATIO 4.3 02/18/2013     Mckenna Mccall RN

## 2021-05-11 ENCOUNTER — DOCUMENTATION ONLY (OUTPATIENT)
Dept: LAB | Facility: CLINIC | Age: 61
End: 2021-05-11

## 2021-05-11 DIAGNOSIS — E78.00 HIGH CHOLESTEROL: Primary | ICD-10-CM

## 2021-05-12 DIAGNOSIS — E78.00 HIGH CHOLESTEROL: ICD-10-CM

## 2021-05-12 LAB
ALBUMIN SERPL-MCNC: 4.1 G/DL (ref 3.4–5)
ALP SERPL-CCNC: 85 U/L (ref 40–150)
ALT SERPL W P-5'-P-CCNC: 40 U/L (ref 0–70)
ANION GAP SERPL CALCULATED.3IONS-SCNC: 5 MMOL/L (ref 3–14)
AST SERPL W P-5'-P-CCNC: 18 U/L (ref 0–45)
BILIRUB SERPL-MCNC: 0.9 MG/DL (ref 0.2–1.3)
BUN SERPL-MCNC: 13 MG/DL (ref 7–30)
CALCIUM SERPL-MCNC: 9 MG/DL (ref 8.5–10.1)
CHLORIDE SERPL-SCNC: 104 MMOL/L (ref 94–109)
CHOLEST SERPL-MCNC: 246 MG/DL
CO2 SERPL-SCNC: 28 MMOL/L (ref 20–32)
CREAT SERPL-MCNC: 0.93 MG/DL (ref 0.66–1.25)
GFR SERPL CREATININE-BSD FRML MDRD: 88 ML/MIN/{1.73_M2}
GLUCOSE SERPL-MCNC: 105 MG/DL (ref 70–99)
HDLC SERPL-MCNC: 49 MG/DL
LDLC SERPL CALC-MCNC: 146 MG/DL
NONHDLC SERPL-MCNC: 197 MG/DL
POTASSIUM SERPL-SCNC: 4 MMOL/L (ref 3.4–5.3)
PROT SERPL-MCNC: 7.7 G/DL (ref 6.8–8.8)
SODIUM SERPL-SCNC: 137 MMOL/L (ref 133–144)
TRIGL SERPL-MCNC: 255 MG/DL

## 2021-05-12 PROCEDURE — 80053 COMPREHEN METABOLIC PANEL: CPT | Performed by: FAMILY MEDICINE

## 2021-05-12 PROCEDURE — 36415 COLL VENOUS BLD VENIPUNCTURE: CPT | Performed by: FAMILY MEDICINE

## 2021-05-12 PROCEDURE — 80061 LIPID PANEL: CPT | Performed by: FAMILY MEDICINE

## 2021-06-03 DIAGNOSIS — E78.00 HIGH CHOLESTEROL: ICD-10-CM

## 2021-06-03 RX ORDER — ATORVASTATIN CALCIUM 10 MG/1
TABLET, FILM COATED ORAL
Qty: 90 TABLET | Refills: 1 | Status: SHIPPED | OUTPATIENT
Start: 2021-06-03 | End: 2021-06-09

## 2021-06-03 NOTE — TELEPHONE ENCOUNTER
Routing refill request to provider for review/approval because:  Drug interaction warning    Loreta Huerta BSN, RN

## 2021-06-08 NOTE — PROGRESS NOTES
The 10-year ASCVD risk score (Viningestee PINON Jr., et al., 2013) is: 11.4%    Values used to calculate the score:      Age: 60 years      Sex: Male      Is Non- : No      Diabetic: No      Tobacco smoker: No      Systolic Blood Pressure: 134 mmHg      Is BP treated: No      HDL Cholesterol: 49 mg/dL      Total Cholesterol: 246 mg/dL  SUBJECTIVE:  60 year old enters for recheck of high cholesterol.  Pt. Has been taking med and has hives as a side effects. Patient when takes Lipitor has hives. Pt is following diet.  Denies chest pain and SOB.  Past Medical History:   Diagnosis Date     Allergic rhinitis due to animal dander      Allergy to mold spores     6/11/12 skin tests pos. for:  cat/dog/CR/DM/M/T/G/W     Bell's palsy      Diagnostic skin and sensitization tests 6/11/12 skin tests pos. for:  cat/dog/CR/DM/M/T/G/W     Need for desensitization to allergens IT 6/12 to 8/13 only-stopped by pt, no hx of rxn--IT for cat/dog/DM/M/T/G/W      Rhinitis, allergic to other allergen      Seasonal allergic conjunctivitis      Seasonal allergic rhinitis      Past Surgical History:   Procedure Laterality Date     COLONOSCOPY  3/4/2013    Procedure: COLONOSCOPY;  screening colonoscopy,;  Surgeon: Tai Monroe MD;  Location:  OR     ESOPHAGOSCOPY, GASTROSCOPY, DUODENOSCOPY (EGD), COMBINED N/A 11/3/2014    Procedure: COMBINED ESOPHAGOSCOPY, GASTROSCOPY, DUODENOSCOPY (EGD), BIOPSY SINGLE OR MULTIPLE;  Surgeon: Renato Vasquez MD;  Location:  OR       Current Outpatient Medications:      fluticasone (FLONASE) 50 MCG/ACT nasal spray, Spray 2 sprays in nostril daily, Disp: 17 g, Rfl: 3     ketoconazole (NIZORAL) 2 % external shampoo, Apply topically daily as needed for itching or irritation, Disp: 120 mL, Rfl: 0     omeprazole (PRILOSEC) 40 MG DR capsule, Take 1 capsule (40 mg) by mouth daily Take 30-60 minutes before a meal., Disp: 90 capsule, Rfl: 3  Reviewed health maintenance   Patient Active  "Problem List   Diagnosis     CARDIOVASCULAR SCREENING; LDL GOAL LESS THAN 160     Dry eye syndrome     TB (tuberculosis), treated     Diagnostic skin and sensitization tests     Seasonal allergic rhinitis     Seasonal allergic conjunctivitis     Allergic rhinitis due to animal dander     Allergy to mold spores     Rhinitis, allergic to other allergen     Esophageal reflux     Advanced directives, counseling/discussion     Cough     Hemoptysis     Pulmonary tuberculosis     Gastroesophageal reflux disease without esophagitis     Snoring     Gastroesophageal reflux disease with esophagitis       OBJECTIVE:  no apparent distress  /86   Pulse 60   Temp 97  F (36.1  C) (Tympanic)   Resp 14   Ht 1.778 m (5' 10\")   Wt 70.8 kg (156 lb)   SpO2 99%   BMI 22.38 kg/m        Exam:  Constitutional: healthy, alert and no distress  Head: Normocephalic. No masses, lesions, tenderness or abnormalities  Neck: Neck supple. No adenopathy. Thyroid symmetric, normal size,  Cardiovascular: negative, PMI normal. No lifts, heaves, or thrills. RRR. No murmurs, clicks gallops or rub  Respiratory: negative Lungs clear    Orders Only on 05/12/2021   Component Date Value Ref Range Status     Cholesterol 05/12/2021 246* <200 mg/dL Final    Desirable:       <200 mg/dl     Triglycerides 05/12/2021 255* <150 mg/dL Final    Comment: Borderline high:  150-199 mg/dl  High:             200-499 mg/dl  Very high:       >499 mg/dl  Fasting specimen       HDL Cholesterol 05/12/2021 49  >39 mg/dL Final     LDL Cholesterol Calculated 05/12/2021 146* <100 mg/dL Final    Comment: Above desirable:  100-129 mg/dl  Borderline High:  130-159 mg/dL  High:             160-189 mg/dL  Very high:       >189 mg/dl       Non HDL Cholesterol 05/12/2021 197* <130 mg/dL Final    Comment: Above Desirable:  130-159 mg/dl  Borderline high:  160-189 mg/dl  High:             190-219 mg/dl  Very high:       >219 mg/dl       Sodium 05/12/2021 137  133 - 144 mmol/L Final "     Potassium 05/12/2021 4.0  3.4 - 5.3 mmol/L Final     Chloride 05/12/2021 104  94 - 109 mmol/L Final     Carbon Dioxide 05/12/2021 28  20 - 32 mmol/L Final     Anion Gap 05/12/2021 5  3 - 14 mmol/L Final     Glucose 05/12/2021 105* 70 - 99 mg/dL Final    Fasting specimen     Urea Nitrogen 05/12/2021 13  7 - 30 mg/dL Final     Creatinine 05/12/2021 0.93  0.66 - 1.25 mg/dL Final     GFR Estimate 05/12/2021 88  >60 mL/min/[1.73_m2] Final    Comment: Non  GFR Calc  Starting 12/18/2018, serum creatinine based estimated GFR (eGFR) will be   calculated using the Chronic Kidney Disease Epidemiology Collaboration   (CKD-EPI) equation.       GFR Estimate If Black 05/12/2021 >90  >60 mL/min/[1.73_m2] Final    Comment:  GFR Calc  Starting 12/18/2018, serum creatinine based estimated GFR (eGFR) will be   calculated using the Chronic Kidney Disease Epidemiology Collaboration   (CKD-EPI) equation.       Calcium 05/12/2021 9.0  8.5 - 10.1 mg/dL Final     Bilirubin Total 05/12/2021 0.9  0.2 - 1.3 mg/dL Final     Albumin 05/12/2021 4.1  3.4 - 5.0 g/dL Final     Protein Total 05/12/2021 7.7  6.8 - 8.8 g/dL Final     Alkaline Phosphatase 05/12/2021 85  40 - 150 U/L Final     ALT 05/12/2021 40  0 - 70 U/L Final     AST 05/12/2021 18  0 - 45 U/L Final           ICD-10-CM    1. High cholesterol  E78.00    2. Seborrheic dermatitis  L21.9 ketoconazole (NIZORAL) 2 % external shampoo    PLAN: stop statin because of hives   Follow up in 6 months

## 2021-06-09 ENCOUNTER — OFFICE VISIT (OUTPATIENT)
Dept: FAMILY MEDICINE | Facility: CLINIC | Age: 61
End: 2021-06-09
Payer: COMMERCIAL

## 2021-06-09 VITALS
SYSTOLIC BLOOD PRESSURE: 134 MMHG | RESPIRATION RATE: 14 BRPM | TEMPERATURE: 97 F | DIASTOLIC BLOOD PRESSURE: 86 MMHG | WEIGHT: 156 LBS | HEIGHT: 70 IN | BODY MASS INDEX: 22.33 KG/M2 | HEART RATE: 60 BPM | OXYGEN SATURATION: 99 %

## 2021-06-09 DIAGNOSIS — L21.9 SEBORRHEIC DERMATITIS: ICD-10-CM

## 2021-06-09 DIAGNOSIS — E78.00 HIGH CHOLESTEROL: Primary | ICD-10-CM

## 2021-06-09 PROCEDURE — 99214 OFFICE O/P EST MOD 30 MIN: CPT | Performed by: FAMILY MEDICINE

## 2021-06-09 RX ORDER — KETOCONAZOLE 20 MG/ML
SHAMPOO TOPICAL DAILY PRN
Qty: 120 ML | Refills: 0 | Status: SHIPPED | OUTPATIENT
Start: 2021-06-09 | End: 2021-11-03

## 2021-06-09 RX ORDER — ROSUVASTATIN CALCIUM 5 MG/1
5 TABLET, COATED ORAL DAILY
Qty: 30 TABLET | Refills: 5 | Status: SHIPPED | OUTPATIENT
Start: 2021-06-09 | End: 2021-09-15

## 2021-06-09 ASSESSMENT — MIFFLIN-ST. JEOR: SCORE: 1523.86

## 2021-09-14 DIAGNOSIS — E78.00 HIGH CHOLESTEROL: ICD-10-CM

## 2021-09-15 RX ORDER — ROSUVASTATIN CALCIUM 5 MG/1
TABLET, COATED ORAL
Qty: 90 TABLET | Refills: 1 | Status: SHIPPED | OUTPATIENT
Start: 2021-09-15 | End: 2022-01-31 | Stop reason: DRUGHIGH

## 2021-11-03 ENCOUNTER — OFFICE VISIT (OUTPATIENT)
Dept: FAMILY MEDICINE | Facility: CLINIC | Age: 61
End: 2021-11-03
Payer: COMMERCIAL

## 2021-11-03 VITALS
WEIGHT: 163 LBS | OXYGEN SATURATION: 99 % | SYSTOLIC BLOOD PRESSURE: 135 MMHG | TEMPERATURE: 97 F | HEART RATE: 63 BPM | RESPIRATION RATE: 16 BRPM | DIASTOLIC BLOOD PRESSURE: 85 MMHG | BODY MASS INDEX: 23.39 KG/M2

## 2021-11-03 DIAGNOSIS — K21.9 GASTROESOPHAGEAL REFLUX DISEASE WITHOUT ESOPHAGITIS: ICD-10-CM

## 2021-11-03 DIAGNOSIS — E78.00 HIGH CHOLESTEROL: ICD-10-CM

## 2021-11-03 PROCEDURE — 99214 OFFICE O/P EST MOD 30 MIN: CPT | Performed by: FAMILY MEDICINE

## 2021-11-03 RX ORDER — ROSUVASTATIN CALCIUM 10 MG/1
10 TABLET, COATED ORAL DAILY
Qty: 90 TABLET | Refills: 1 | Status: SHIPPED | OUTPATIENT
Start: 2021-11-03 | End: 2022-04-29

## 2021-11-03 RX ORDER — OMEPRAZOLE 40 MG/1
40 CAPSULE, DELAYED RELEASE ORAL DAILY
Qty: 90 CAPSULE | Refills: 3 | Status: SHIPPED | OUTPATIENT
Start: 2021-11-03 | End: 2022-10-26

## 2021-11-03 NOTE — PROGRESS NOTES
SUBJECTIVE:  61 year old enters for recheck of high cholesterol.  Pt. Has been taking med and has no side effects. Pt is following diet.  Denies chest pain and SOB.  Reflux has been stable. Has had upper endoscopy.  One caffeine per week  Past Medical History:   Diagnosis Date     Allergic rhinitis due to animal dander      Allergy to mold spores     6/11/12 skin tests pos. for:  cat/dog/CR/DM/M/T/G/W     Bell's palsy      Diagnostic skin and sensitization tests 6/11/12 skin tests pos. for:  cat/dog/CR/DM/M/T/G/W     Need for desensitization to allergens IT 6/12 to 8/13 only-stopped by pt, no hx of rxn--IT for cat/dog/DM/M/T/G/W      Rhinitis, allergic to other allergen      Seasonal allergic conjunctivitis      Seasonal allergic rhinitis      Past Surgical History:   Procedure Laterality Date     COLONOSCOPY  3/4/2013    Procedure: COLONOSCOPY;  screening colonoscopy,;  Surgeon: Tai Monroe MD;  Location: [facility] OR     ESOPHAGOSCOPY, GASTROSCOPY, DUODENOSCOPY (EGD), COMBINED N/A 11/3/2014    Procedure: COMBINED ESOPHAGOSCOPY, GASTROSCOPY, DUODENOSCOPY (EGD), BIOPSY SINGLE OR MULTIPLE;  Surgeon: Renato Vasquez MD;  Location: [facility] OR       Current Outpatient Medications:      fluticasone (FLONASE) 50 MCG/ACT nasal spray, Spray 2 sprays in nostril daily, Disp: 17 g, Rfl: 3     omeprazole (PRILOSEC) 40 MG DR capsule, Take 1 capsule (40 mg) by mouth daily Take 30-60 minutes before a meal., Disp: 90 capsule, Rfl: 3     rosuvastatin (CRESTOR) 10 MG tablet, Take 1 tablet (10 mg) by mouth daily, Disp: 90 tablet, Rfl: 1     rosuvastatin (CRESTOR) 5 MG tablet, TAKE 1 TABLET BY MOUTH EVERY DAY, Disp: 90 tablet, Rfl: 1  Reviewed health maintenance   Patient Active Problem List   Diagnosis     CARDIOVASCULAR SCREENING; LDL GOAL LESS THAN 160     Dry eye syndrome     TB (tuberculosis), treated     Diagnostic skin and sensitization tests     Seasonal allergic rhinitis     Seasonal allergic conjunctivitis     Allergic  rhinitis due to animal dander     Allergy to mold spores     Rhinitis, allergic to other allergen     Esophageal reflux     Advanced directives, counseling/discussion     Cough     Hemoptysis     Pulmonary tuberculosis     Gastroesophageal reflux disease without esophagitis     Snoring     Gastroesophageal reflux disease with esophagitis     High cholesterol       OBJECTIVE:  no apparent distress  /85   Pulse 63   Temp 97  F (36.1  C) (Tympanic)   Resp 16   Wt 73.9 kg (163 lb)   SpO2 99%   BMI 23.39 kg/m        Exam:  Constitutional: healthy, alert and no distress  Head: Normocephalic. No masses, lesions, tenderness or abnormalities  Neck: Neck supple. No adenopathy. Thyroid symmetric, normal size,  Cardiovascular: negative, PMI normal. No lifts, heaves, or thrills. RRR. No murmurs, clicks gallops or rub  Respiratory: negative Lungs clear    Orders Only on 05/12/2021   Component Date Value Ref Range Status     Cholesterol 05/12/2021 246* <200 mg/dL Final    Desirable:       <200 mg/dl     Triglycerides 05/12/2021 255* <150 mg/dL Final    Comment: Borderline high:  150-199 mg/dl  High:             200-499 mg/dl  Very high:       >499 mg/dl  Fasting specimen       HDL Cholesterol 05/12/2021 49  >39 mg/dL Final     LDL Cholesterol Calculated 05/12/2021 146* <100 mg/dL Final    Comment: Above desirable:  100-129 mg/dl  Borderline High:  130-159 mg/dL  High:             160-189 mg/dL  Very high:       >189 mg/dl       Non HDL Cholesterol 05/12/2021 197* <130 mg/dL Final    Comment: Above Desirable:  130-159 mg/dl  Borderline high:  160-189 mg/dl  High:             190-219 mg/dl  Very high:       >219 mg/dl       Sodium 05/12/2021 137  133 - 144 mmol/L Final     Potassium 05/12/2021 4.0  3.4 - 5.3 mmol/L Final     Chloride 05/12/2021 104  94 - 109 mmol/L Final     Carbon Dioxide 05/12/2021 28  20 - 32 mmol/L Final     Anion Gap 05/12/2021 5  3 - 14 mmol/L Final     Glucose 05/12/2021 105* 70 - 99 mg/dL Final     Fasting specimen     Urea Nitrogen 05/12/2021 13  7 - 30 mg/dL Final     Creatinine 05/12/2021 0.93  0.66 - 1.25 mg/dL Final     GFR Estimate 05/12/2021 88  >60 mL/min/[1.73_m2] Final    Comment: Non  GFR Calc  Starting 12/18/2018, serum creatinine based estimated GFR (eGFR) will be   calculated using the Chronic Kidney Disease Epidemiology Collaboration   (CKD-EPI) equation.       GFR Estimate If Black 05/12/2021 >90  >60 mL/min/[1.73_m2] Final    Comment:  GFR Calc  Starting 12/18/2018, serum creatinine based estimated GFR (eGFR) will be   calculated using the Chronic Kidney Disease Epidemiology Collaboration   (CKD-EPI) equation.       Calcium 05/12/2021 9.0  8.5 - 10.1 mg/dL Final     Bilirubin Total 05/12/2021 0.9  0.2 - 1.3 mg/dL Final     Albumin 05/12/2021 4.1  3.4 - 5.0 g/dL Final     Protein Total 05/12/2021 7.7  6.8 - 8.8 g/dL Final     Alkaline Phosphatase 05/12/2021 85  40 - 150 U/L Final     ALT 05/12/2021 40  0 - 70 U/L Final     AST 05/12/2021 18  0 - 45 U/L Final           ICD-10-CM    1. Gastroesophageal reflux disease without esophagitis  K21.9 omeprazole (PRILOSEC) 40 MG DR capsule   2. High cholesterol  E78.00 rosuvastatin (CRESTOR) 10 MG tablet    PLAN: Follow up in 6 months    Increase Crestor

## 2021-11-21 ENCOUNTER — HEALTH MAINTENANCE LETTER (OUTPATIENT)
Age: 61
End: 2021-11-21

## 2022-01-31 ENCOUNTER — ANCILLARY PROCEDURE (OUTPATIENT)
Dept: GENERAL RADIOLOGY | Facility: CLINIC | Age: 62
End: 2022-01-31
Attending: NURSE PRACTITIONER
Payer: COMMERCIAL

## 2022-01-31 ENCOUNTER — OFFICE VISIT (OUTPATIENT)
Dept: URGENT CARE | Facility: URGENT CARE | Age: 62
End: 2022-01-31
Payer: COMMERCIAL

## 2022-01-31 VITALS
SYSTOLIC BLOOD PRESSURE: 172 MMHG | WEIGHT: 157.2 LBS | HEART RATE: 73 BPM | OXYGEN SATURATION: 98 % | TEMPERATURE: 97.6 F | DIASTOLIC BLOOD PRESSURE: 94 MMHG | BODY MASS INDEX: 22.56 KG/M2

## 2022-01-31 DIAGNOSIS — M25.512 CHRONIC LEFT SHOULDER PAIN: Primary | ICD-10-CM

## 2022-01-31 DIAGNOSIS — M25.512 CHRONIC LEFT SHOULDER PAIN: ICD-10-CM

## 2022-01-31 DIAGNOSIS — G89.29 CHRONIC LEFT SHOULDER PAIN: Primary | ICD-10-CM

## 2022-01-31 DIAGNOSIS — G89.29 CHRONIC LEFT SHOULDER PAIN: ICD-10-CM

## 2022-01-31 PROCEDURE — 73030 X-RAY EXAM OF SHOULDER: CPT | Mod: LT | Performed by: RADIOLOGY

## 2022-01-31 PROCEDURE — 99214 OFFICE O/P EST MOD 30 MIN: CPT | Performed by: NURSE PRACTITIONER

## 2022-01-31 RX ORDER — KETOCONAZOLE 20 MG/ML
SHAMPOO TOPICAL WEEKLY
COMMUNITY
Start: 2022-01-17 | End: 2023-04-12

## 2022-01-31 RX ORDER — TRIAMCINOLONE ACETONIDE 1 MG/ML
LOTION TOPICAL DAILY
COMMUNITY
Start: 2022-01-17 | End: 2023-04-12

## 2022-01-31 NOTE — PROGRESS NOTES
SUBJECTIVE:    Stacia Abernathy is a 61 year old male who is seen for left shoulder pain. Started 3-4 months ago, gettng worse in the last few weeks.   Tried salon paas with mild help.  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    Past medical history notable for: reviewed on the up to date problem list in the chart    REVIEW OF SYSTEMS:  CONSTITUTIONAL:NEGATIVE for fever, chills, change in weight  INTEGUMENTARY/SKIN: NEGATIVE for worrisome rashes, moles or lesions  MUSCULOSKELETAL:See HPI above  NEURO: NEGATIVE for weakness, dizziness or paresthesias    BP (!) 172/94   Pulse 73   Temp 97.6  F (36.4  C) (Tympanic)   Wt 71.3 kg (157 lb 3.2 oz)   SpO2 98%   BMI 22.56 kg/m    EXAM:  GENERAL APPEARANCE: alert and no distress  GAIT: NORMAL   SKIN: no suspicious lesions or rashes  NEURO: Normal strength and tone, mentation intact and speech normal  PSYCH:  mentation appears normal and affect normal/bright  MUSCULOSKELETAL: LEFT SHOULDER  Inspection: no swelling, bruising, discoloration, or obvious deformity or asymmetry. Pain is generalized    ASSESSMENT/PLAN  (M25.512,  G89.29) Chronic left shoulder pain  (primary encounter diagnosis)    Plan: Orthopedic  Referral to follow up   XR Shoulder Left G/E 3 Views no acute findings on my read pending radiology  Also discussed tylenol or ibuprofen, ice heat and stretching no heavy lifting given note for work      Stacia to follow up with Primary Care provider regarding elevated blood pressure.      Anita Torres, APRN CNP

## 2022-01-31 NOTE — LETTER
[facility] [place]  [address] [facility] [address]  Phone: [phone]    January 31, 2022        Stacia Abernathy  [address] [facility] [address]          To whom it may concern:    RE: Stacia Abernathy    Patient was seen and treated today at our clinic.  Patient may return to work  with the following: left side lifting restrictions, shoulder pain will see ortho for further evaluation/ instruction.    Please contact me for questions or concerns.      Sincerely,        RAS Ricardo CNP

## 2022-01-31 NOTE — PATIENT INSTRUCTIONS
Patient Education     Shoulder Pain with Uncertain Cause   Shoulder pain can have many causes. Pain often comes from the structures that surround the shoulder joint. These are the joint capsule, ligaments, tendons, muscles, and bursa. Pain can also come from cartilage in the joint. Cartilage can become worn out or injured. It s important to know what s causing your pain so the healthcare provider can use the correct treatment. But sometimes it s difficult to find the exact cause of shoulder pain. You may need to see a specialist (orthopedist). You may also need special tests such as a CT scan or MRI. The provider may need to use special tools to look inside the joint (arthroscopy).  Shoulder pain can be treated with a sling or a device that keeps your shoulder from moving. You can take an anti-inflammatory medicine such as ibuprofen to ease pain. You may need to do special shoulder exercises. Follow up with a specialist if the pain is severe or doesn t go away after a few weeks.  Home care  Follow these tips when caring for yourself at home:    If a sling was given to you, leave it in place for the time advised by your healthcare provider. If you aren t sure how long to wear it, ask for advice. If the sling becomes loose, adjust it so that your forearm is level with the ground. Your shoulder should feel well supported.    Put an ice pack on the injured area for 20 minutes every 1 to 2 hours the first day. You can make your own ice pack by putting ice cubes in a plastic bag. Wrap the bag in a thin towel. Continue with ice packs 3 to 4 times a day for the next 2 days. Then use the pack as needed to ease pain and swelling.    You may use acetaminophen or ibuprofen to control pain, unless another pain medicine was prescribed. If you have chronic liver or kidney disease, talk with your healthcare provider before using these medicines. Also talk with your provider if you ve ever had a stomach ulcer or digestive  bleeding.    Shoulder pain may seem worse at night, when there is less to distract you from the pain. If you sleep on your side, try to keep weight off your painful shoulder. Propping pillows behind you may stop you from rolling over onto that shoulder during sleep.     Shoulder and elbow joints can become stiff if left in a sling for too long. You should start range of motion exercises about 7 to 10 days after the injury. Talk with your provider to find out what type of exercises to do and how soon to start.    You can take the sling off to shower or bathe.  Follow-up care  Follow up with your healthcare provider if you don t start to get better in the next 5 days.  When to seek medical advice  Call your healthcare provider right away if any of these occur:    Pain or swelling gets worse or continues for more than a few days    Your hand or fingers become cold, blue, numb, or tingly    Large amount of bruising on your shoulder or upper arm    Trouble moving your hand or fingers    Weakness in your hand or fingers    Your shoulder becomes stiff    It feels like your shoulder is popping out    You are less able to do your daily activities  Jerry last reviewed this educational content on 1/1/2020 2000-2021 [employer]. All rights reserved. This information is not intended as a substitute for professional medical care. Always follow your healthcare professional's instructions.

## 2022-02-08 ENCOUNTER — OFFICE VISIT (OUTPATIENT)
Dept: ORTHOPEDICS | Facility: CLINIC | Age: 62
End: 2022-02-08
Attending: NURSE PRACTITIONER
Payer: COMMERCIAL

## 2022-02-08 VITALS
WEIGHT: 157 LBS | BODY MASS INDEX: 22.48 KG/M2 | DIASTOLIC BLOOD PRESSURE: 84 MMHG | HEIGHT: 70 IN | SYSTOLIC BLOOD PRESSURE: 142 MMHG

## 2022-02-08 DIAGNOSIS — G89.29 CHRONIC LEFT SHOULDER PAIN: ICD-10-CM

## 2022-02-08 DIAGNOSIS — M25.512 CHRONIC LEFT SHOULDER PAIN: ICD-10-CM

## 2022-02-08 DIAGNOSIS — M75.102 ROTATOR CUFF SYNDROME OF LEFT SHOULDER: Primary | ICD-10-CM

## 2022-02-08 PROCEDURE — 99243 OFF/OP CNSLTJ NEW/EST LOW 30: CPT | Mod: 25 | Performed by: PEDIATRICS

## 2022-02-08 PROCEDURE — 20610 DRAIN/INJ JOINT/BURSA W/O US: CPT | Mod: LT | Performed by: PEDIATRICS

## 2022-02-08 RX ORDER — TRIAMCINOLONE ACETONIDE 40 MG/ML
40 INJECTION, SUSPENSION INTRA-ARTICULAR; INTRAMUSCULAR
Status: SHIPPED | OUTPATIENT
Start: 2022-02-08

## 2022-02-08 RX ORDER — LIDOCAINE HYDROCHLORIDE 10 MG/ML
2 INJECTION, SOLUTION INFILTRATION; PERINEURAL
Status: SHIPPED | OUTPATIENT
Start: 2022-02-08

## 2022-02-08 RX ADMIN — LIDOCAINE HYDROCHLORIDE 2 ML: 10 INJECTION, SOLUTION INFILTRATION; PERINEURAL at 14:01

## 2022-02-08 RX ADMIN — TRIAMCINOLONE ACETONIDE 40 MG: 40 INJECTION, SUSPENSION INTRA-ARTICULAR; INTRAMUSCULAR at 14:01

## 2022-02-08 ASSESSMENT — MIFFLIN-ST. JEOR: SCORE: 1523.4

## 2022-02-08 NOTE — PATIENT INSTRUCTIONS
Left shoulder subacromial steroid injection done today.  Physical therapy referral placed.  Letter provided for work restrictions, 2 weeks to start.  Otherwise, monitor course 1 month with combination injection and physical therapy.    If you have any further questions for your physician or physician s care team you can call [phone] and use option 3 to leave a voice message. Calls received during business hours will be returned same day.        Injection Discharge Instructions      You may shower, however avoid swimming, tub baths or hot tubs for 24 hours following your procedure    You may have a mild to moderate increase in pain for several days following the injection.    It may take up to 14 days for the steroid medication to start working although you may feel the effect as early as a few days after the procedure.    You may use ice packs for 10-15 minutes, 3 to 4 times a day at the injection site for comfort    You may use anti-inflammatory medications (such as Ibuprofen or Aleve or Advil) if you are able to take safely, or acetaminophen (Tylenol) for pain control if necessary    If you were fasting, you may resume your normal diet and medications after the procedure    If you have diabetes, check your blood sugar more frequently than usual as your blood sugar may be higher than normal for 10-14 days following a steroid injection. Contact your doctor who manages your diabetes if your blood sugar is higher than usual      If you experience any of the following, call the clinic @ [phone]  - Fever over 100 degree F  - Swelling, bleeding, redness, drainage, warmth at the injection site  - New or worsening pain

## 2022-02-08 NOTE — LETTER
[facility] [place]  [address] [address]  [facility] [address]  Phone: [phone]  Fax: [phone]      February 8, 2022      RE: Stacia Abernathy  [address] [facility] [address]        To whom it may concern:    Stacia Abernathy was seen in clinic today for evaluation and treatment of shoulder pain. The employee is ABLE to return to work next scheduled work date.    When the patient returns to work, the following restrictions apply for 2 weeks:  Reach Above Shoulders: Not at all (0 hours) on left; keep work close to body if using left upper extremity  Lift, carry, push, and pull: up to 5 lbs independently on left; may use left as helper for right as tolerated  Keep work close to body  Avoid repetitive motions, outstretched arm with left side      Sincerely,            Oz Boone, DO, CAQ

## 2022-02-08 NOTE — LETTER
2/8/2022         RE: Stacia Abernathy  [address] [facility] [address]        Dear Colleague,    Thank you for referring your patient, Stacia Abernathy, to the [facility]. Please see a copy of my visit note below.    ASSESSMENT & PLAN    Stacia was seen today for pain.    Diagnoses and all orders for this visit:    Rotator cuff syndrome of left shoulder  -     [facility] PT and Hand Referral; Future  -     Large Joint Injection/Arthocentesis: L subacromial bursa    Chronic left shoulder pain  -     Orthopedic  Referral  -     [facility] Hand Referral; Future  -     Large Joint Injection/Arthocentesis: L subacromial bursa      Favor cuff source, with some upper trap muscular pain as well.  We discussed the following: symptom treatment, activity modification/rest, imaging, rehab and injection therapy. Following discussion, plan:  Icing, heat, OTC meds prn.  He agreed with PT.  Also interested in injection. Subacromial steroid injection done today.  We discussed consideration of additional imaging of the affected area with MRI, but this is not required currently given assessment and plan for other intervention.  Questions answered. Discussed signs and symptoms that may indicate more serious issues; the patient was instructed to seek appropriate care if noted. Stacia indicates understanding of these issues and agrees with the plan.        See Patient Instructions  Patient Instructions   Left shoulder subacromial steroid injection done today.  Physical therapy referral placed.  Letter provided for work restrictions, 2 weeks to start.  Otherwise, monitor course 1 month with combination injection and physical therapy.    If you have any further questions for your physician or physician s care team you can call [phone] and use option 3 to leave a voice message. Calls received during business hours will be returned same day.        Injection Discharge Instructions      You may shower,  however avoid swimming, tub baths or hot tubs for 24 hours following your procedure    You may have a mild to moderate increase in pain for several days following the injection.    It may take up to 14 days for the steroid medication to start working although you may feel the effect as early as a few days after the procedure.    You may use ice packs for 10-15 minutes, 3 to 4 times a day at the injection site for comfort    You may use anti-inflammatory medications (such as Ibuprofen or Aleve or Advil) if you are able to take safely, or acetaminophen (Tylenol) for pain control if necessary    If you were fasting, you may resume your normal diet and medications after the procedure    If you have diabetes, check your blood sugar more frequently than usual as your blood sugar may be higher than normal for 10-14 days following a steroid injection. Contact your doctor who manages your diabetes if your blood sugar is higher than usual      If you experience any of the following, call the clinic @ [phone]  - Fever over 100 degree F  - Swelling, bleeding, redness, drainage, warmth at the injection site  - New or worsening pain          Oz Boone [facility]      CC: Anita Torres      -----  Chief Complaint   Patient presents with     Left Shoulder - Pain       SUBJECTIVE  Stacia Abernathy is a/an 61 year old male who is seen in consultation at the request of  Anita Torres  C.N.PMeggan for evaluation of left shoulder pain for the past 3-4 months.  Pain down into his biceps/triceps.  Does have some posterior shoulder pain as well.  Has had some relief with Salon Pas.       The patient is seen by themselves.  The patient is Right handed    Onset: 3-4 month(s) ago. Reports insidious onset without acute precipitating event.  Location of Pain: left shoulder   Worsened by: use, reaching, lifting   Better with: Topcial analgesics   Treatments tried: topical analgesics  "  Associated symptoms: no distal numbness or tingling; denies swelling or warmth    Orthopedic/Surgical history: NO  Social History/Occupation:      No family history pertinent to patient's problem today.     **  Pain is primarily posterior left shoulder on initial indication. However, also notes pain into upper arm.  Some stinging pain in left posterior upper arm.  Feels like arm is heavier, tired.  Notes some left neck pain, also sometimes right neck pain.  Drives for work, has to reach to side and behind self for work.      REVIEW OF SYSTEMS:  Review of Systems   All other systems reviewed and are negative.        OBJECTIVE:  BP (!) 142/84   Ht 1.778 m (5' 10\")   Wt 71.2 kg (157 lb)   BMI 22.53 kg/m     General: healthy, alert and in no distress  HEENT: no scleral icterus or conjunctival erythema  Skin: no suspicious lesions or rash. No jaundice.  CV: distal perfusion intact   Resp: normal respiratory effort without conversational dyspnea   Psych: normal mood and affect  Gait: normal steady gait with appropriate coordination and balance   Neuro: Normal light sensory exam of  extremity       Cervical Spine Exam    Range of Motion:       forward flexion mild tightness         extension no change        lateral rotation no change        lateral flexion with contralateral stretching, not really reproducing pain    Special Tests:     Some left posterior shoulder pain with Spurling to left, no radicular symptoms    Skin:     well perfused       capillary refill brisk    Lymphatics:      no edema noted in the upper extremities           Left Shoulder exam    ROM:      forward flexion 120        abduction 120       internal rotation waist posteriorly       external rotation lacking 10-15 deg  Pain with AROM above    Tender: mild upper trap distally    Non Tender: remainder    Strength:      abduction 4+/5       internal rotation 4/5       external rotation 3+/5    Impingement testing:      positive (+) " Jefferson       Pain with empty can            RADIOLOGY:  I independently visualized and reviewed these images with the patient  Some AC arthrosis.  Also with small lung nodules. He had previous chest imaging showing the same, with CT in 2016 indicating no further work up required.    Results for orders placed or performed in visit on 01/31/22   XR Shoulder Left G/E 3 Views    Narrative    LEFT SHOULDER THREE OR MORE VIEWS   1/31/2022 11:04 AM     HISTORY: Chronic left shoulder pain.    COMPARISON: None.      Impression    IMPRESSION: Mild acromioclavicular degenerative changes. No fracture  or dislocation. Multiple tiny nodular densities in the left upper lobe  with apical pleural thickening.    MARIS MARTINEZ MD         SYSTEM ID:  [id number]     Large Joint Injection/Arthocentesis: L subacromial bursa    Date/Time: 2/8/2022 2:01 PM  Performed by: Oz Boone DO  Authorized by: Oz Boone DO     Indications:  Pain  Needle Size:  25 G  Guidance: landmark guided    Approach:  Posterolateral  Location:  Shoulder      Site:  L subacromial bursa  Medications:  40 mg triamcinolone 40 MG/ML; 2 mL lidocaine 1 %  Outcome:  Tolerated well, no immediate complications  Procedure discussed: discussed risks, benefits, and alternatives    Consent Given by:  Patient  Timeout: timeout called immediately prior to procedure    Prep: patient was prepped and draped in usual sterile fashion                  Again, thank you for allowing me to participate in the care of your patient.        Sincerely,        Oz Boone DO

## 2022-02-08 NOTE — PROGRESS NOTES
ASSESSMENT & PLAN    Stacia was seen today for pain.    Diagnoses and all orders for this visit:    Rotator cuff syndrome of left shoulder  -     [facility] PT and Hand Referral; Future  -     Large Joint Injection/Arthocentesis: L subacromial bursa    Chronic left shoulder pain  -     Orthopedic  Referral  -     [facility] PT [facility] Hand Referral; Future  -     Large Joint Injection/Arthocentesis: L subacromial bursa      Favor cuff source, with some upper trap muscular pain as well.  We discussed the following: symptom treatment, activity modification/rest, imaging, rehab and injection therapy. Following discussion, plan:  Icing, heat, OTC meds prn.  He agreed with PT.  Also interested in injection. Subacromial steroid injection done today.  We discussed consideration of additional imaging of the affected area with MRI, but this is not required currently given assessment and plan for other intervention.  Questions answered. Discussed signs and symptoms that may indicate more serious issues; the patient was instructed to seek appropriate care if noted. Stacia indicates understanding of these issues and agrees with the plan.        See Patient Instructions  Patient Instructions   Left shoulder subacromial steroid injection done today.  Physical therapy referral placed.  Letter provided for work restrictions, 2 weeks to start.  Otherwise, monitor course 1 month with combination injection and physical therapy.    If you have any further questions for your physician or physician s care team you can call [phone] and use option 3 to leave a voice message. Calls received during business hours will be returned same day.        Injection Discharge Instructions      You may shower, however avoid swimming, tub baths or hot tubs for 24 hours following your procedure    You may have a mild to moderate increase in pain for several days following the injection.    It may take up to 14 days for the steroid medication to start working  although you may feel the effect as early as a few days after the procedure.    You may use ice packs for 10-15 minutes, 3 to 4 times a day at the injection site for comfort    You may use anti-inflammatory medications (such as Ibuprofen or Aleve or Advil) if you are able to take safely, or acetaminophen (Tylenol) for pain control if necessary    If you were fasting, you may resume your normal diet and medications after the procedure    If you have diabetes, check your blood sugar more frequently than usual as your blood sugar may be higher than normal for 10-14 days following a steroid injection. Contact your doctor who manages your diabetes if your blood sugar is higher than usual      If you experience any of the following, call the clinic @ [phone]  - Fever over 100 degree F  - Swelling, bleeding, redness, drainage, warmth at the injection site  - New or worsening pain          Oz Boone [facility]      CC: Anita Torres      -----  Chief Complaint   Patient presents with     Left Shoulder - Pain       SUBJECTIVE  Stacia Abernathy is a/an 61 year old male who is seen in consultation at the request of  Anita Torres  C.N.MANI for evaluation of left shoulder pain for the past 3-4 months.  Pain down into his biceps/triceps.  Does have some posterior shoulder pain as well.  Has had some relief with Salon Pas.       The patient is seen by themselves.  The patient is Right handed    Onset: 3-4 month(s) ago. Reports insidious onset without acute precipitating event.  Location of Pain: left shoulder   Worsened by: use, reaching, lifting   Better with: Topcial analgesics   Treatments tried: topical analgesics   Associated symptoms: no distal numbness or tingling; denies swelling or warmth    Orthopedic/Surgical history: NO  Social History/Occupation:      No family history pertinent to patient's problem today.     **  Pain is primarily posterior left  "shoulder on initial indication. However, also notes pain into upper arm.  Some stinging pain in left posterior upper arm.  Feels like arm is heavier, tired.  Notes some left neck pain, also sometimes right neck pain.  Drives for work, has to reach to side and behind self for work.      REVIEW OF SYSTEMS:  Review of Systems   All other systems reviewed and are negative.        OBJECTIVE:  BP (!) 142/84   Ht 1.778 m (5' 10\")   Wt 71.2 kg (157 lb)   BMI 22.53 kg/m     General: healthy, alert and in no distress  HEENT: no scleral icterus or conjunctival erythema  Skin: no suspicious lesions or rash. No jaundice.  CV: distal perfusion intact   Resp: normal respiratory effort without conversational dyspnea   Psych: normal mood and affect  Gait: normal steady gait with appropriate coordination and balance   Neuro: Normal light sensory exam of  extremity       Cervical Spine Exam    Range of Motion:       forward flexion mild tightness         extension no change        lateral rotation no change        lateral flexion with contralateral stretching, not really reproducing pain    Special Tests:     Some left posterior shoulder pain with Spurling to left, no radicular symptoms    Skin:     well perfused       capillary refill brisk    Lymphatics:      no edema noted in the upper extremities           Left Shoulder exam    ROM:      forward flexion 120        abduction 120       internal rotation waist posteriorly       external rotation lacking 10-15 deg  Pain with AROM above    Tender: mild upper trap distally    Non Tender: remainder    Strength:      abduction 4+/5       internal rotation 4/5       external rotation 3+/5    Impingement testing:      positive (+) Jefferson       Pain with empty can            RADIOLOGY:  I independently visualized and reviewed these images with the patient  Some AC arthrosis.  Also with small lung nodules. He had previous chest imaging showing the same, with CT in 2016 indicating no " further work up required.    Results for orders placed or performed in visit on 01/31/22   XR Shoulder Left G/E 3 Views    Narrative    LEFT SHOULDER THREE OR MORE VIEWS   1/31/2022 11:04 AM     HISTORY: Chronic left shoulder pain.    COMPARISON: None.      Impression    IMPRESSION: Mild acromioclavicular degenerative changes. No fracture  or dislocation. Multiple tiny nodular densities in the left upper lobe  with apical pleural thickening.    MARIS MARTINEZ MD         SYSTEM ID:  [id number]     Large Joint Injection/Arthocentesis: L subacromial bursa    Date/Time: 2/8/2022 2:01 PM  Performed by: Oz Boone DO  Authorized by: Oz Boone DO     Indications:  Pain  Needle Size:  25 G  Guidance: landmark guided    Approach:  Posterolateral  Location:  Shoulder      Site:  L subacromial bursa  Medications:  40 mg triamcinolone 40 MG/ML; 2 mL lidocaine 1 %  Outcome:  Tolerated well, no immediate complications  Procedure discussed: discussed risks, benefits, and alternatives    Consent Given by:  Patient  Timeout: timeout called immediately prior to procedure    Prep: patient was prepped and draped in usual sterile fashion

## 2022-04-29 DIAGNOSIS — E78.00 HIGH CHOLESTEROL: ICD-10-CM

## 2022-04-29 RX ORDER — ROSUVASTATIN CALCIUM 10 MG/1
TABLET, COATED ORAL
Qty: 90 TABLET | Refills: 1 | Status: SHIPPED | OUTPATIENT
Start: 2022-04-29 | End: 2022-10-26

## 2022-05-11 ENCOUNTER — OFFICE VISIT (OUTPATIENT)
Dept: FAMILY MEDICINE | Facility: CLINIC | Age: 62
End: 2022-05-11
Payer: COMMERCIAL

## 2022-05-11 VITALS
TEMPERATURE: 97.3 F | WEIGHT: 156 LBS | SYSTOLIC BLOOD PRESSURE: 163 MMHG | BODY MASS INDEX: 22.38 KG/M2 | HEART RATE: 72 BPM | RESPIRATION RATE: 18 BRPM | OXYGEN SATURATION: 97 % | DIASTOLIC BLOOD PRESSURE: 103 MMHG

## 2022-05-11 DIAGNOSIS — K21.9 GASTROESOPHAGEAL REFLUX DISEASE WITHOUT ESOPHAGITIS: ICD-10-CM

## 2022-05-11 DIAGNOSIS — E78.00 HIGH CHOLESTEROL: Primary | ICD-10-CM

## 2022-05-11 LAB
ALBUMIN SERPL-MCNC: 4.1 G/DL (ref 3.4–5)
ALP SERPL-CCNC: 82 U/L (ref 40–150)
ALT SERPL W P-5'-P-CCNC: 104 U/L (ref 0–70)
ANION GAP SERPL CALCULATED.3IONS-SCNC: 6 MMOL/L (ref 3–14)
AST SERPL W P-5'-P-CCNC: 36 U/L (ref 0–45)
BASOPHILS # BLD AUTO: 0 10E3/UL (ref 0–0.2)
BASOPHILS NFR BLD AUTO: 1 %
BILIRUB SERPL-MCNC: 0.9 MG/DL (ref 0.2–1.3)
BUN SERPL-MCNC: 14 MG/DL (ref 7–30)
CALCIUM SERPL-MCNC: 9.6 MG/DL (ref 8.5–10.1)
CHLORIDE BLD-SCNC: 107 MMOL/L (ref 94–109)
CHOLEST SERPL-MCNC: 126 MG/DL
CO2 SERPL-SCNC: 28 MMOL/L (ref 20–32)
CREAT SERPL-MCNC: 0.89 MG/DL (ref 0.66–1.25)
EOSINOPHIL # BLD AUTO: 0.8 10E3/UL (ref 0–0.7)
EOSINOPHIL NFR BLD AUTO: 11 %
ERYTHROCYTE [DISTWIDTH] IN BLOOD BY AUTOMATED COUNT: 13.1 % (ref 10–15)
FASTING STATUS PATIENT QL REPORTED: YES
GFR SERPL CREATININE-BSD FRML MDRD: >90 ML/MIN/1.73M2
GLUCOSE BLD-MCNC: 113 MG/DL (ref 70–99)
HCT VFR BLD AUTO: 47.5 % (ref 40–53)
HDLC SERPL-MCNC: 53 MG/DL
HGB BLD-MCNC: 15.7 G/DL (ref 13.3–17.7)
LDLC SERPL CALC-MCNC: 45 MG/DL
LYMPHOCYTES # BLD AUTO: 1.9 10E3/UL (ref 0.8–5.3)
LYMPHOCYTES NFR BLD AUTO: 26 %
MCH RBC QN AUTO: 29.3 PG (ref 26.5–33)
MCHC RBC AUTO-ENTMCNC: 33.1 G/DL (ref 31.5–36.5)
MCV RBC AUTO: 89 FL (ref 78–100)
MONOCYTES # BLD AUTO: 0.6 10E3/UL (ref 0–1.3)
MONOCYTES NFR BLD AUTO: 8 %
NEUTROPHILS # BLD AUTO: 4 10E3/UL (ref 1.6–8.3)
NEUTROPHILS NFR BLD AUTO: 55 %
NONHDLC SERPL-MCNC: 73 MG/DL
PLATELET # BLD AUTO: 245 10E3/UL (ref 150–450)
POTASSIUM BLD-SCNC: 4 MMOL/L (ref 3.4–5.3)
PROT SERPL-MCNC: 7.7 G/DL (ref 6.8–8.8)
RBC # BLD AUTO: 5.36 10E6/UL (ref 4.4–5.9)
SODIUM SERPL-SCNC: 141 MMOL/L (ref 133–144)
TRIGL SERPL-MCNC: 142 MG/DL
WBC # BLD AUTO: 7.3 10E3/UL (ref 4–11)

## 2022-05-11 PROCEDURE — 80061 LIPID PANEL: CPT | Performed by: FAMILY MEDICINE

## 2022-05-11 PROCEDURE — 99214 OFFICE O/P EST MOD 30 MIN: CPT | Performed by: FAMILY MEDICINE

## 2022-05-11 PROCEDURE — 85025 COMPLETE CBC W/AUTO DIFF WBC: CPT | Performed by: FAMILY MEDICINE

## 2022-05-11 PROCEDURE — 80053 COMPREHEN METABOLIC PANEL: CPT | Performed by: FAMILY MEDICINE

## 2022-05-11 PROCEDURE — 36415 COLL VENOUS BLD VENIPUNCTURE: CPT | Performed by: FAMILY MEDICINE

## 2022-05-11 RX ORDER — BETAMETHASONE DIPROPIONATE 0.5 MG/ML
LOTION, AUGMENTED TOPICAL
COMMUNITY
Start: 2022-05-10 | End: 2023-04-12

## 2022-05-11 ASSESSMENT — PAIN SCALES - GENERAL: PAINLEVEL: NO PAIN (0)

## 2022-05-11 NOTE — PROGRESS NOTES
SUBJECTIVE:  61 year old enters for recheck of high cholesterol.  Pt. Has been taking med and has no side effects. Pt is following diet.  Denies chest pain and SOB.  Past Medical History:   Diagnosis Date     Allergic rhinitis due to animal dander      Allergy to mold spores     6/11/12 skin tests pos. for:  cat/dog/CR/DM/M/T/G/W     Bell's palsy      Diagnostic skin and sensitization tests 6/11/12 skin tests pos. for:  cat/dog/CR/DM/M/T/G/W     Need for desensitization to allergens IT 6/12 to 8/13 only-stopped by pt, no hx of rxn--IT for cat/dog/DM/M/T/G/W      Rhinitis, allergic to other allergen      Seasonal allergic conjunctivitis      Seasonal allergic rhinitis      Past Surgical History:   Procedure Laterality Date     COLONOSCOPY  3/4/2013    Procedure: COLONOSCOPY;  screening colonoscopy,;  Surgeon: Tai Monroe MD;  Location:  OR     ESOPHAGOSCOPY, GASTROSCOPY, DUODENOSCOPY (EGD), COMBINED N/A 11/3/2014    Procedure: COMBINED ESOPHAGOSCOPY, GASTROSCOPY, DUODENOSCOPY (EGD), BIOPSY SINGLE OR MULTIPLE;  Surgeon: Renato Vasquez MD;  Location: [facility] OR       Current Outpatient Medications:      augmented betamethasone dipropionate (DIPROLENE) 0.05 % external lotion, , Disp: , Rfl:      fluticasone (FLONASE) 50 MCG/ACT nasal spray, Spray 2 sprays in nostril daily, Disp: 17 g, Rfl: 3     ketoconazole (NIZORAL) 2 % external shampoo, once a week, Disp: , Rfl:      omeprazole (PRILOSEC) 40 MG DR capsule, Take 1 capsule (40 mg) by mouth daily Take 30-60 minutes before a meal., Disp: 90 capsule, Rfl: 3     rosuvastatin (CRESTOR) 10 MG tablet, TAKE 1 TABLET BY MOUTH EVERY DAY, Disp: 90 tablet, Rfl: 1     triamcinolone (KENALOG) 0.1 % external lotion, daily, Disp: , Rfl:     Current Facility-Administered Medications:      lidocaine 1 % injection 2 mL, 2 mL, , , ViancaenholOz kennedy, DO, 2 mL at 02/08/22 1401     triamcinolone (KENALOG-40) injection 40 mg, 40 mg, , , Oz Boone, DO, 40 mg at  02/08/22 1401  Reviewed health maintenance   Patient Active Problem List   Diagnosis     CARDIOVASCULAR SCREENING; LDL GOAL LESS THAN 160     Dry eye syndrome     TB (tuberculosis), treated     Diagnostic skin and sensitization tests     Seasonal allergic rhinitis     Seasonal allergic conjunctivitis     Allergic rhinitis due to animal dander     Allergy to mold spores     Rhinitis, allergic to other allergen     Esophageal reflux     Advanced directives, counseling/discussion     Cough     Hemoptysis     Pulmonary tuberculosis     Gastroesophageal reflux disease without esophagitis     Snoring     Gastroesophageal reflux disease with esophagitis     High cholesterol       OBJECTIVE:  no apparent distress  BP (!) 163/103   Pulse 72   Temp 97.3  F (36.3  C) (Tympanic)   Resp 18   Wt 156 lb (70.8 kg)   SpO2 97%   BMI 22.38 kg/m        Exam:  Constitutional: healthy, alert and no distress  Head: Normocephalic. No masses, lesions, tenderness or abnormalities  Neck: Neck supple. No adenopathy. Thyroid symmetric, normal size,  Cardiovascular: negative, PMI normal. No lifts, heaves, or thrills. RRR. No murmurs, clicks gallops or rub  Respiratory: negative Lungs clear    Orders Only on 05/12/2021   Component Date Value Ref Range Status     Cholesterol 05/12/2021 246 (A) <200 mg/dL Final    Desirable:       <200 mg/dl     Triglycerides 05/12/2021 255 (A) <150 mg/dL Final    Comment: Borderline high:  150-199 mg/dl  High:             200-499 mg/dl  Very high:       >499 mg/dl  Fasting specimen       HDL Cholesterol 05/12/2021 49  >39 mg/dL Final     LDL Cholesterol Calculated 05/12/2021 146 (A) <100 mg/dL Final    Comment: Above desirable:  100-129 mg/dl  Borderline High:  130-159 mg/dL  High:             160-189 mg/dL  Very high:       >189 mg/dl       Non HDL Cholesterol 05/12/2021 197 (A) <130 mg/dL Final    Comment: Above Desirable:  130-159 mg/dl  Borderline high:  160-189 mg/dl  High:             190-219  mg/dl  Very high:       >219 mg/dl       Sodium 05/12/2021 137  133 - 144 mmol/L Final     Potassium 05/12/2021 4.0  3.4 - 5.3 mmol/L Final     Chloride 05/12/2021 104  94 - 109 mmol/L Final     Carbon Dioxide 05/12/2021 28  20 - 32 mmol/L Final     Anion Gap 05/12/2021 5  3 - 14 mmol/L Final     Glucose 05/12/2021 105 (A) 70 - 99 mg/dL Final    Fasting specimen     Urea Nitrogen 05/12/2021 13  7 - 30 mg/dL Final     Creatinine 05/12/2021 0.93  0.66 - 1.25 mg/dL Final     GFR Estimate 05/12/2021 88  >60 mL/min/[1.73_m2] Final    Comment: Non  GFR Calc  Starting 12/18/2018, serum creatinine based estimated GFR (eGFR) will be   calculated using the Chronic Kidney Disease Epidemiology Collaboration   (CKD-EPI) equation.       GFR Estimate If Black 05/12/2021 >90  >60 mL/min/[1.73_m2] Final    Comment:  GFR Calc  Starting 12/18/2018, serum creatinine based estimated GFR (eGFR) will be   calculated using the Chronic Kidney Disease Epidemiology Collaboration   (CKD-EPI) equation.       Calcium 05/12/2021 9.0  8.5 - 10.1 mg/dL Final     Bilirubin Total 05/12/2021 0.9  0.2 - 1.3 mg/dL Final     Albumin 05/12/2021 4.1  3.4 - 5.0 g/dL Final     Protein Total 05/12/2021 7.7  6.8 - 8.8 g/dL Final     Alkaline Phosphatase 05/12/2021 85  40 - 150 U/L Final     ALT 05/12/2021 40  0 - 70 U/L Final     AST 05/12/2021 18  0 - 45 U/L Final        SUBJECTIVE:  61 year old.The patient has a history of gerd for many year.  Symptoms include heartburn. Caffeine intake twice per day. Etoh n asa or nsaids n smoking n. Better with omeprazole.  Worse with s[icy foods  Nausea n vomiting n. Endoscopy y  Reviewed health maintenance   Patient Active Problem List   Diagnosis     CARDIOVASCULAR SCREENING; LDL GOAL LESS THAN 160     Dry eye syndrome     TB (tuberculosis), treated     Diagnostic skin and sensitization tests     Seasonal allergic rhinitis     Seasonal allergic conjunctivitis     Allergic rhinitis due to  "animal dander     Allergy to mold spores     Rhinitis, allergic to other allergen     Esophageal reflux     Advanced directives, counseling/discussion     Cough     Hemoptysis     Pulmonary tuberculosis     Gastroesophageal reflux disease without esophagitis     Snoring     Gastroesophageal reflux disease with esophagitis     High cholesterol         OBJECTIVE:  Exam:  Constitutional: healthy, alert and no distress  Head: Normocephalic. No masses, lesions, tenderness or abnormalities  Neck: Neck supple. No adenopathy. Thyroid symmetric, normal size,, Carotids without bruits.  ENT: ENT exam normal, no neck nodes or sinus tenderness  Cardiovascular: negative, PMI normal. No lifts, heaves, or thrills. RRR. No murmurs, clicks gallops or rub  Respiratory: negative\",Good diaphragmatic excursion. Lungs clear  Gastrointestinal: Abdomen soft, non-tender. BS normal. No masses, organomegaly      ICD-10-CM    1. High cholesterol  E78.00 Lipid panel reflex to direct LDL Fasting     **Comprehensive metabolic panel FUTURE 2mo   2. Gastroesophageal reflux disease without esophagitis  K21.9 **CBC with platelets differential FUTURE 2mo    PLAN:Follow up in 1 year           "

## 2022-10-26 DIAGNOSIS — K21.9 GASTROESOPHAGEAL REFLUX DISEASE WITHOUT ESOPHAGITIS: ICD-10-CM

## 2022-10-26 DIAGNOSIS — E78.00 HIGH CHOLESTEROL: ICD-10-CM

## 2022-10-26 RX ORDER — OMEPRAZOLE 40 MG/1
40 CAPSULE, DELAYED RELEASE ORAL DAILY
Qty: 90 CAPSULE | Refills: 1 | Status: SHIPPED | OUTPATIENT
Start: 2022-10-26 | End: 2023-04-12

## 2022-10-26 RX ORDER — ROSUVASTATIN CALCIUM 10 MG/1
TABLET, COATED ORAL
Qty: 90 TABLET | Refills: 1 | Status: SHIPPED | OUTPATIENT
Start: 2022-10-26 | End: 2023-04-12

## 2023-01-08 ENCOUNTER — HEALTH MAINTENANCE LETTER (OUTPATIENT)
Age: 63
End: 2023-01-08

## 2023-03-20 ENCOUNTER — TELEPHONE (OUTPATIENT)
Dept: FAMILY MEDICINE | Facility: CLINIC | Age: 63
End: 2023-03-20
Payer: COMMERCIAL

## 2023-03-20 NOTE — TELEPHONE ENCOUNTER
Patient Quality Outreach    Patient is due for the following:   Colon Cancer Screening  Physical Preventive Adult Physical    Next Steps:   Patient has upcoming appointment, these items will be addressed at that time.    Type of outreach:    Chart review performed, no outreach needed.      Questions for provider review:    None     BEN IBRAHIM MA

## 2023-04-01 ENCOUNTER — TELEPHONE (OUTPATIENT)
Dept: FAMILY MEDICINE | Facility: CLINIC | Age: 63
End: 2023-04-01
Payer: COMMERCIAL

## 2023-04-01 DIAGNOSIS — E78.00 HIGH CHOLESTEROL: Primary | ICD-10-CM

## 2023-04-06 ENCOUNTER — LAB (OUTPATIENT)
Dept: LAB | Facility: CLINIC | Age: 63
End: 2023-04-06
Payer: COMMERCIAL

## 2023-04-06 DIAGNOSIS — E78.00 HIGH CHOLESTEROL: ICD-10-CM

## 2023-04-06 LAB
ALBUMIN SERPL-MCNC: 4.2 G/DL (ref 3.4–5)
ALP SERPL-CCNC: 74 U/L (ref 40–150)
ALT SERPL W P-5'-P-CCNC: 94 U/L (ref 0–70)
ANION GAP SERPL CALCULATED.3IONS-SCNC: 4 MMOL/L (ref 3–14)
AST SERPL W P-5'-P-CCNC: 37 U/L (ref 0–45)
BILIRUB SERPL-MCNC: 0.8 MG/DL (ref 0.2–1.3)
BUN SERPL-MCNC: 14 MG/DL (ref 7–30)
CALCIUM SERPL-MCNC: 9.1 MG/DL (ref 8.5–10.1)
CHLORIDE BLD-SCNC: 105 MMOL/L (ref 94–109)
CHOLEST SERPL-MCNC: 186 MG/DL
CO2 SERPL-SCNC: 28 MMOL/L (ref 20–32)
CREAT SERPL-MCNC: 0.83 MG/DL (ref 0.66–1.25)
FASTING STATUS PATIENT QL REPORTED: YES
GFR SERPL CREATININE-BSD FRML MDRD: >90 ML/MIN/1.73M2
GLUCOSE BLD-MCNC: 119 MG/DL (ref 70–99)
HDLC SERPL-MCNC: 60 MG/DL
LDLC SERPL CALC-MCNC: 98 MG/DL
NONHDLC SERPL-MCNC: 126 MG/DL
POTASSIUM BLD-SCNC: 3.8 MMOL/L (ref 3.4–5.3)
PROT SERPL-MCNC: 7.9 G/DL (ref 6.8–8.8)
SODIUM SERPL-SCNC: 137 MMOL/L (ref 133–144)
TRIGL SERPL-MCNC: 138 MG/DL

## 2023-04-06 PROCEDURE — 80053 COMPREHEN METABOLIC PANEL: CPT

## 2023-04-06 PROCEDURE — 36415 COLL VENOUS BLD VENIPUNCTURE: CPT

## 2023-04-06 PROCEDURE — 80061 LIPID PANEL: CPT

## 2023-04-12 ENCOUNTER — OFFICE VISIT (OUTPATIENT)
Dept: FAMILY MEDICINE | Facility: CLINIC | Age: 63
End: 2023-04-12
Payer: COMMERCIAL

## 2023-04-12 ENCOUNTER — MYC REFILL (OUTPATIENT)
Dept: FAMILY MEDICINE | Facility: CLINIC | Age: 63
End: 2023-04-12

## 2023-04-12 VITALS
TEMPERATURE: 98 F | WEIGHT: 155 LBS | OXYGEN SATURATION: 97 % | DIASTOLIC BLOOD PRESSURE: 82 MMHG | RESPIRATION RATE: 20 BRPM | HEART RATE: 70 BPM | BODY MASS INDEX: 22.96 KG/M2 | HEIGHT: 69 IN | SYSTOLIC BLOOD PRESSURE: 150 MMHG

## 2023-04-12 DIAGNOSIS — F17.200 ENCOUNTER FOR SCREENING FOR MALIGNANT NEOPLASM OF LUNG IN CURRENT SMOKER WITH 30 PACK YEAR HISTORY OR GREATER: ICD-10-CM

## 2023-04-12 DIAGNOSIS — R73.9 HIGH BLOOD SUGAR: ICD-10-CM

## 2023-04-12 DIAGNOSIS — Z12.2 ENCOUNTER FOR SCREENING FOR MALIGNANT NEOPLASM OF LUNG IN CURRENT SMOKER WITH 30 PACK YEAR HISTORY OR GREATER: ICD-10-CM

## 2023-04-12 DIAGNOSIS — R21 RASH: Primary | ICD-10-CM

## 2023-04-12 DIAGNOSIS — K21.9 GASTROESOPHAGEAL REFLUX DISEASE WITHOUT ESOPHAGITIS: ICD-10-CM

## 2023-04-12 DIAGNOSIS — L29.9 ITCHY SCALP: ICD-10-CM

## 2023-04-12 DIAGNOSIS — R73.03 PREDIABETES: ICD-10-CM

## 2023-04-12 DIAGNOSIS — Z00.00 ROUTINE GENERAL MEDICAL EXAMINATION AT A HEALTH CARE FACILITY: Primary | ICD-10-CM

## 2023-04-12 DIAGNOSIS — Z12.11 SPECIAL SCREENING FOR MALIGNANT NEOPLASMS, COLON: ICD-10-CM

## 2023-04-12 DIAGNOSIS — E78.00 HIGH CHOLESTEROL: ICD-10-CM

## 2023-04-12 DIAGNOSIS — I10 ESSENTIAL HYPERTENSION: ICD-10-CM

## 2023-04-12 LAB — HBA1C MFR BLD: 5.8 % (ref 0–5.6)

## 2023-04-12 PROCEDURE — 99396 PREV VISIT EST AGE 40-64: CPT | Performed by: FAMILY MEDICINE

## 2023-04-12 PROCEDURE — 83036 HEMOGLOBIN GLYCOSYLATED A1C: CPT | Performed by: FAMILY MEDICINE

## 2023-04-12 PROCEDURE — 36415 COLL VENOUS BLD VENIPUNCTURE: CPT | Performed by: FAMILY MEDICINE

## 2023-04-12 PROCEDURE — 99214 OFFICE O/P EST MOD 30 MIN: CPT | Mod: 25 | Performed by: FAMILY MEDICINE

## 2023-04-12 RX ORDER — OMEPRAZOLE 40 MG/1
40 CAPSULE, DELAYED RELEASE ORAL DAILY
Qty: 90 CAPSULE | Refills: 3 | Status: SHIPPED | OUTPATIENT
Start: 2023-04-12 | End: 2024-04-22

## 2023-04-12 RX ORDER — LISINOPRIL 5 MG/1
5 TABLET ORAL DAILY
Qty: 30 TABLET | Refills: 1 | Status: SHIPPED | OUTPATIENT
Start: 2023-04-12 | End: 2023-05-04

## 2023-04-12 RX ORDER — METFORMIN HCL 500 MG
500 TABLET, EXTENDED RELEASE 24 HR ORAL
Qty: 90 TABLET | Refills: 0 | Status: SHIPPED | OUTPATIENT
Start: 2023-04-12 | End: 2023-05-10

## 2023-04-12 RX ORDER — ROSUVASTATIN CALCIUM 10 MG/1
10 TABLET, COATED ORAL DAILY
Qty: 90 TABLET | Refills: 3 | Status: SHIPPED | OUTPATIENT
Start: 2023-04-12 | End: 2024-02-19

## 2023-04-12 RX ORDER — METFORMIN HCL 500 MG
500 TABLET, EXTENDED RELEASE 24 HR ORAL
Qty: 9 TABLET | Refills: 0 | Status: SHIPPED | OUTPATIENT
Start: 2023-04-12 | End: 2023-04-12

## 2023-04-12 RX ORDER — KETOCONAZOLE 20 MG/ML
SHAMPOO TOPICAL WEEKLY
Qty: 120 ML | Refills: 3 | Status: SHIPPED | OUTPATIENT
Start: 2023-04-12 | End: 2023-09-13

## 2023-04-12 ASSESSMENT — ENCOUNTER SYMPTOMS
ABDOMINAL PAIN: 0
CONSTIPATION: 0
PARESTHESIAS: 1
PALPITATIONS: 0
DIARRHEA: 0
ARTHRALGIAS: 0
SORE THROAT: 0
HEMATURIA: 0
COUGH: 1
NAUSEA: 0
WEAKNESS: 0
HEARTBURN: 1
EYE PAIN: 0
HEMATOCHEZIA: 0
JOINT SWELLING: 0
DYSURIA: 0
CHILLS: 0
MYALGIAS: 1
HEADACHES: 0
FREQUENCY: 0
NERVOUS/ANXIOUS: 0
SHORTNESS OF BREATH: 0
DIZZINESS: 0
FEVER: 0

## 2023-04-12 ASSESSMENT — PAIN SCALES - GENERAL: PAINLEVEL: NO PAIN (0)

## 2023-04-12 NOTE — TELEPHONE ENCOUNTER
Patient is calling and states he checked with his pharmacy regarding his metformin prescription and only 9 was sent to the pharmacy, pt states he think the provider forgot to send qty #90.   Tami Curry RN    [facility]- [facility]

## 2023-04-12 NOTE — PROGRESS NOTES
SUBJECTIVE:   CC: Stacia is an 62 year old who presents for preventative health visit.       2023     8:44 AM   Additional Questions   Roomed by Daniela VALDES   Accompanied by self      Patient has been advised of split billing requirements and indicates understanding: Yes  Healthy Habits:     Getting at least 3 servings of Calcium per day:  Yes    Bi-annual eye exam:  Yes    Dental care twice a year:  Yes    Sleep apnea or symptoms of sleep apnea:  Excessive snoring    Diet:  Low salt and Low fat/cholesterol    Frequency of exercise:  2-3 days/week    Duration of exercise:  45-60 minutes    Taking medications regularly:  Yes    Medication side effects:  None    PHQ-2 Total Score: 0    Additional concerns today:  No              Today's PHQ-2 Score:       2023     8:41 AM   PHQ-2 (  [employer])   Q1: Little interest or pleasure in doing things 0   Q2: Feeling down, depressed or hopeless 0   PHQ-2 Score 0   Q1: Little interest or pleasure in doing things Not at all    Not at all   Q2: Feeling down, depressed or hopeless Not at all    Not at all   PHQ-2 Score 0    0       Have you ever done Advance Care Planning? (For example, a Health Directive, POLST, or a discussion with a medical provider or your loved ones about your wishes): No, advance care planning information given to patient to review.  Patient declined advance care planning discussion at this time.    Social History     Tobacco Use     Smoking status: Former     Types: Cigarettes     Quit date: 2008     Years since quittin.5     Smokeless tobacco: Never   Vaping Use     Vaping status: Never Used   Substance Use Topics     Alcohol use: Yes     Comment: OCC              2023     8:41 AM   Alcohol Use   Prescreen: >3 drinks/day or >7 drinks/week? No       Last PSA: No results found for: PSA    Reviewed orders with patient. Reviewed health maintenance and updated orders accordingly - Yes  SUBJECTIVE:  62 year old enters for recheck of high  cholesterol.  Pt. Has been taking med and has no side effects. Pt is following diet.  Denies chest pain and SOB.  Past Medical History:   Diagnosis Date     Allergic rhinitis due to animal dander      Allergy to mold spores     6/11/12 skin tests pos. for:  cat/dog/CR/DM/M/T/G/W     Bell's palsy      Diagnostic skin and sensitization tests 6/11/12 skin tests pos. for:  cat/dog/CR/DM/M/T/G/W     Need for desensitization to allergens IT 6/12 to 8/13 only-stopped by pt, no hx of rxn--IT for cat/dog/DM/M/T/G/W      Rhinitis, allergic to other allergen      Seasonal allergic conjunctivitis      Seasonal allergic rhinitis      Past Surgical History:   Procedure Laterality Date     COLONOSCOPY  3/4/2013    Procedure: COLONOSCOPY;  screening colonoscopy,;  Surgeon: Tai Monroe MD;  Location:  OR     ESOPHAGOSCOPY, GASTROSCOPY, DUODENOSCOPY (EGD), COMBINED N/A 11/3/2014    Procedure: COMBINED ESOPHAGOSCOPY, GASTROSCOPY, DUODENOSCOPY (EGD), BIOPSY SINGLE OR MULTIPLE;  Surgeon: Renato Vasquez MD;  Location:  OR       Current Outpatient Medications:      augmented betamethasone dipropionate (DIPROLENE) 0.05 % external lotion, , Disp: , Rfl:      fluticasone (FLONASE) 50 MCG/ACT nasal spray, Spray 2 sprays in nostril daily, Disp: 17 g, Rfl: 3     ketoconazole (NIZORAL) 2 % external shampoo, Apply topically once a week, Disp: 120 mL, Rfl: 3     metFORMIN (GLUCOPHAGE XR) 500 MG 24 hr tablet, Take 1 tablet (500 mg) by mouth daily (with dinner), Disp: 9 tablet, Rfl: 0     omeprazole (PRILOSEC) 40 MG DR capsule, Take 1 capsule (40 mg) by mouth daily Take 30-60 minutes before a meal., Disp: 90 capsule, Rfl: 3     rosuvastatin (CRESTOR) 10 MG tablet, Take 1 tablet (10 mg) by mouth daily, Disp: 90 tablet, Rfl: 3    Current Facility-Administered Medications:      lidocaine 1 % injection 2 mL, 2 mL, , , Oz Boone, DO, 2 mL at 02/08/22 1401     triamcinolone (KENALOG-40) injection 40 mg, 40 mg, , ,  "Oz Boone, DO, 40 mg at 02/08/22 1401  Reviewed health maintenance   Patient Active Problem List   Diagnosis     CARDIOVASCULAR SCREENING; LDL GOAL LESS THAN 160     Dry eye syndrome     TB (tuberculosis), treated     Diagnostic skin and sensitization tests     Seasonal allergic rhinitis     Seasonal allergic conjunctivitis     Allergic rhinitis due to animal dander     Allergy to mold spores     Rhinitis, allergic to other allergen     Esophageal reflux     Advanced directives, counseling/discussion     Cough     Hemoptysis     Pulmonary tuberculosis     Gastroesophageal reflux disease without esophagitis     Snoring     Gastroesophageal reflux disease with esophagitis     High cholesterol       OBJECTIVE:  no apparent distress  BP (!) 150/82   Pulse 70   Temp 98  F (36.7  C) (Tympanic)   Resp 20   Ht 1.759 m (5' 9.25\")   Wt 70.3 kg (155 lb)   SpO2 97%   BMI 22.72 kg/m        Exam:  Constitutional: healthy, alert and no distress  Head: Normocephalic. No masses, lesions, tenderness or abnormalities  Neck: Neck supple. No adenopathy. Thyroid symmetric, normal size,  Cardiovascular: negative, PMI normal. No lifts, heaves, or thrills. RRR. No murmurs, clicks gallops or rub  Respiratory: negative Lungs clear    Lab on 04/06/2023   Component Date Value Ref Range Status     Cholesterol 04/06/2023 186  <200 mg/dL Final     Triglycerides 04/06/2023 138  <150 mg/dL Final     Direct Measure HDL 04/06/2023 60  >=40 mg/dL Final     LDL Cholesterol Calculated 04/06/2023 98  <=100 mg/dL Final     Non HDL Cholesterol 04/06/2023 126  <130 mg/dL Final     Patient Fasting > 8hrs? 04/06/2023 Yes   Final     Sodium 04/06/2023 137  133 - 144 mmol/L Final     Potassium 04/06/2023 3.8  3.4 - 5.3 mmol/L Final     Chloride 04/06/2023 105  94 - 109 mmol/L Final     Carbon Dioxide (CO2) 04/06/2023 28  20 - 32 mmol/L Final     Anion Gap 04/06/2023 4  3 - 14 mmol/L Final     Urea Nitrogen 04/06/2023 14  7 - 30 mg/dL Final     " "Creatinine 04/06/2023 0.83  0.66 - 1.25 mg/dL Final     Calcium 04/06/2023 9.1  8.5 - 10.1 mg/dL Final     Glucose 04/06/2023 119 (H)  70 - 99 mg/dL Final     Alkaline Phosphatase 04/06/2023 74  40 - 150 U/L Final     AST 04/06/2023 37  0 - 45 U/L Final     ALT 04/06/2023 94 (H)  0 - 70 U/L Final     Protein Total 04/06/2023 7.9  6.8 - 8.8 g/dL Final     Albumin 04/06/2023 4.2  3.4 - 5.0 g/dL Final     Bilirubin Total 04/06/2023 0.8  0.2 - 1.3 mg/dL Final     GFR Estimate 04/06/2023 >90  >60 mL/min/1.73m2 Final    eGFR calculated using 2021 CKD-EPI equation.             Reviewed and updated as needed this visit by clinical staff   Tobacco  Allergies  Meds              Reviewed and updated as needed this visit by Provider                     Review of Systems   Constitutional: Negative for chills and fever.   HENT: Positive for congestion. Negative for ear pain, hearing loss and sore throat.    Eyes: Negative for pain and visual disturbance.   Respiratory: Positive for cough. Negative for shortness of breath.    Cardiovascular: Negative for chest pain, palpitations and peripheral edema.   Gastrointestinal: Positive for heartburn. Negative for abdominal pain, constipation, diarrhea, hematochezia and nausea.   Genitourinary: Negative for dysuria, frequency, genital sores, hematuria, impotence, penile discharge and urgency.   Musculoskeletal: Positive for myalgias. Negative for arthralgias and joint swelling.   Skin: Positive for rash.   Neurological: Positive for paresthesias. Negative for dizziness, weakness and headaches.   Psychiatric/Behavioral: Negative for mood changes. The patient is not nervous/anxious.            OBJECTIVE:   BP (!) 150/82   Pulse 70   Temp 98  F (36.7  C) (Tympanic)   Resp 20   Ht 1.759 m (5' 9.25\")   Wt 70.3 kg (155 lb)   SpO2 97%   BMI 22.72 kg/m      Physical Exam  GENERAL: healthy, alert and no distress  EYES: Eyes grossly normal to inspection, PERRL and conjunctivae and sclerae " normal  HENT: ear canals and TM's normal, nose and mouth without ulcers or lesions  NECK: no adenopathy, no asymmetry, masses, or scars and thyroid normal to palpation  RESP: lungs clear to auscultation - no rales, rhonchi or wheezes  CV: regular rate and rhythm, normal S1 S2, no S3 or S4, no murmur, click or rub, no peripheral edema and peripheral pulses strong  ABDOMEN: soft, nontender, no hepatosplenomegaly, no masses and bowel sounds normal  MS: no gross musculoskeletal defects noted, no edema  SKIN: no suspicious lesions or rashes  NEURO: Normal strength and tone, mentation intact and speech normal  PSYCH: mentation appears normal, affect normal/bright    Diagnostic Test Results:  Labs reviewed in Epic    ASSESSMENT/PLAN:       ICD-10-CM    1. Routine general medical examination at a health care facility  Z00.00       2. Special screening for malignant neoplasms, colon  Z12.11 COLOGUARD([employer])      3. High blood sugar  R73.9 Hemoglobin A1c     OFFICE/OUTPT VISIT,EST,LEVL IV     Hemoglobin A1c      4. Encounter for screening for malignant neoplasm of lung in current smoker with 30 pack year history or greater  Z12.2 CT Chest Lung Cancer Screen Low Dose Without    F17.200       5. Gastroesophageal reflux disease without esophagitis  K21.9 omeprazole (PRILOSEC) 40 MG DR capsule     OFFICE/OUTPT VISIT,EST,LEVL IV      6. High cholesterol  E78.00 rosuvastatin (CRESTOR) 10 MG tablet     OFFICE/OUTPT VISIT,EST,LEVL IV      7. Itchy scalp  L29.9 ketoconazole (NIZORAL) 2 % external shampoo      8. Prediabetes  R73.03 metFORMIN (GLUCOPHAGE XR) 500 MG 24 hr tablet          Patient has been advised of split billing requirements and indicates understanding: Yes      COUNSELING:   Reviewed preventive health counseling, as reflected in patient instructions       Regular exercise       Healthy diet/nutrition        He reports that he quit smoking about 14 years ago. His smoking use included cigarettes. He has never  used smokeless tobacco.         Juan Jose Hargrove MD  [facility]

## 2023-04-13 ENCOUNTER — LAB (OUTPATIENT)
Dept: FAMILY MEDICINE | Facility: CLINIC | Age: 63
End: 2023-04-13
Payer: COMMERCIAL

## 2023-04-13 DIAGNOSIS — Z12.11 SPECIAL SCREENING FOR MALIGNANT NEOPLASMS, COLON: ICD-10-CM

## 2023-04-13 RX ORDER — BETAMETHASONE DIPROPIONATE 0.5 MG/ML
LOTION, AUGMENTED TOPICAL 2 TIMES DAILY
Qty: 60 ML | Refills: 1 | Status: SHIPPED | OUTPATIENT
Start: 2023-04-13 | End: 2023-10-29

## 2023-04-20 ENCOUNTER — ANCILLARY PROCEDURE (OUTPATIENT)
Dept: CT IMAGING | Facility: CLINIC | Age: 63
End: 2023-04-20
Attending: FAMILY MEDICINE
Payer: COMMERCIAL

## 2023-04-20 DIAGNOSIS — Z12.2 ENCOUNTER FOR SCREENING FOR MALIGNANT NEOPLASM OF LUNG IN CURRENT SMOKER WITH 30 PACK YEAR HISTORY OR GREATER: ICD-10-CM

## 2023-04-20 DIAGNOSIS — F17.200 ENCOUNTER FOR SCREENING FOR MALIGNANT NEOPLASM OF LUNG IN CURRENT SMOKER WITH 30 PACK YEAR HISTORY OR GREATER: ICD-10-CM

## 2023-04-20 PROCEDURE — 71271 CT THORAX LUNG CANCER SCR C-: CPT | Mod: TC | Performed by: RADIOLOGY

## 2023-04-21 ENCOUNTER — TELEPHONE (OUTPATIENT)
Dept: FAMILY MEDICINE | Facility: CLINIC | Age: 63
End: 2023-04-21
Payer: COMMERCIAL

## 2023-04-21 DIAGNOSIS — I71.21 ANEURYSM OF ASCENDING AORTA WITHOUT RUPTURE (H): Primary | ICD-10-CM

## 2023-04-21 NOTE — TELEPHONE ENCOUNTER
Patient had normal ct of lungs and has no signs of lung cancer.  He does have an enlarged aorta exiting his heart and I would like him to have a echocardiogram to make sure his aortic valve is normal and to determine what follow we need to do.

## 2023-04-21 NOTE — TELEPHONE ENCOUNTER
Left message on answering machine for patient to call back.  Scheduling number to schedule an echocardiogram is [phone]    Siri MAGUIREN, RN

## 2023-04-21 NOTE — TELEPHONE ENCOUNTER
Patient notified of provider's message as written below. He was given the number below to schedule the echo. Patient verbalized good understanding, had no further questions and needed no further support.Sonia Alva R.N.

## 2023-04-21 NOTE — TELEPHONE ENCOUNTER
Xiomy calling from imaging with incidental finding on CT CHEST LUNG CANCER SCREEN LOW DOSE  WITHOUT CONTRAST  4/20/2023 9:01  AM:    2. Significant Incidental Finding(s):  Category S: Yes. aortic  aneurysm      To provider to advise      Siri CABRERA, RN

## 2023-04-29 LAB — NONINV COLON CA DNA+OCC BLD SCRN STL QL: NEGATIVE

## 2023-05-04 ENCOUNTER — ANCILLARY PROCEDURE (OUTPATIENT)
Dept: CARDIOLOGY | Facility: CLINIC | Age: 63
End: 2023-05-04
Attending: FAMILY MEDICINE
Payer: COMMERCIAL

## 2023-05-04 DIAGNOSIS — I71.21 ANEURYSM OF ASCENDING AORTA WITHOUT RUPTURE (H): ICD-10-CM

## 2023-05-04 DIAGNOSIS — I10 ESSENTIAL HYPERTENSION: ICD-10-CM

## 2023-05-04 LAB — LVEF ECHO: NORMAL

## 2023-05-04 PROCEDURE — 93306 TTE W/DOPPLER COMPLETE: CPT | Performed by: INTERNAL MEDICINE

## 2023-05-04 RX ORDER — LISINOPRIL 5 MG/1
TABLET ORAL
Qty: 30 TABLET | Refills: 1 | Status: SHIPPED | OUTPATIENT
Start: 2023-05-04 | End: 2023-05-10

## 2023-05-10 ENCOUNTER — OFFICE VISIT (OUTPATIENT)
Dept: FAMILY MEDICINE | Facility: CLINIC | Age: 63
End: 2023-05-10
Payer: COMMERCIAL

## 2023-05-10 VITALS
WEIGHT: 151 LBS | OXYGEN SATURATION: 99 % | RESPIRATION RATE: 19 BRPM | DIASTOLIC BLOOD PRESSURE: 65 MMHG | BODY MASS INDEX: 22.36 KG/M2 | HEIGHT: 69 IN | HEART RATE: 71 BPM | SYSTOLIC BLOOD PRESSURE: 135 MMHG | TEMPERATURE: 96.5 F

## 2023-05-10 DIAGNOSIS — I10 ESSENTIAL HYPERTENSION WITH GOAL BLOOD PRESSURE LESS THAN 130/80: ICD-10-CM

## 2023-05-10 DIAGNOSIS — I71.21 ANEURYSM OF ASCENDING AORTA WITHOUT RUPTURE (H): Primary | ICD-10-CM

## 2023-05-10 DIAGNOSIS — R73.03 PREDIABETES: ICD-10-CM

## 2023-05-10 PROCEDURE — 99214 OFFICE O/P EST MOD 30 MIN: CPT | Performed by: FAMILY MEDICINE

## 2023-05-10 RX ORDER — CARVEDILOL 12.5 MG/1
12.5 TABLET ORAL 2 TIMES DAILY WITH MEALS
Qty: 60 TABLET | Refills: 0 | Status: SHIPPED | OUTPATIENT
Start: 2023-05-10 | End: 2023-05-24

## 2023-05-10 RX ORDER — METFORMIN HCL 500 MG
500 TABLET, EXTENDED RELEASE 24 HR ORAL
Qty: 90 TABLET | Refills: 1 | Status: SHIPPED | OUTPATIENT
Start: 2023-05-10 | End: 2023-10-23

## 2023-05-10 ASSESSMENT — PAIN SCALES - GENERAL: PAINLEVEL: NO PAIN (0)

## 2023-05-10 NOTE — PROGRESS NOTES
Lucia Palomo is a 62 year old, presenting for the following health issues:  Hypertension        5/10/2023     8:46 AM   Additional Questions   Roomed by Daniela VALDES   Accompanied by spouse     History of Present Illness       Hypertension: He presents for follow up of hypertension.  He does not check blood pressure  regularly outside of the clinic. Outpatient blood pressures have not been over 140/90. He follows a low salt diet.     He eats 0-1 servings of fruits and vegetables daily.He consumes 1 sweetened beverage(s) daily.He exercises with enough effort to increase his heart rate 9 or less minutes per day.    He is taking medications regularly.         SUBJECTIVE:  62 year oldyear old male enters with  hypertension.  Pt. Has been compliant with medications and medications were reviewed.  No side effects. No chest pain or sob. Low sodium diet.    Current Outpatient Medications:      augmented betamethasone dipropionate (DIPROLENE) 0.05 % external lotion, Apply topically 2 times daily, Disp: 60 mL, Rfl: 1     carvedilol (COREG) 12.5 MG tablet, Take 1 tablet (12.5 mg) by mouth 2 times daily (with meals), Disp: 60 tablet, Rfl: 0     fluticasone (FLONASE) 50 MCG/ACT nasal spray, Spray 2 sprays in nostril daily, Disp: 17 g, Rfl: 3     lisinopril (ZESTRIL) 5 MG tablet, TAKE 1 TABLET BY MOUTH EVERY DAY, Disp: 30 tablet, Rfl: 1     metFORMIN (GLUCOPHAGE XR) 500 MG 24 hr tablet, Take 1 tablet (500 mg) by mouth daily (with dinner), Disp: 90 tablet, Rfl: 0     omeprazole (PRILOSEC) 40 MG DR capsule, Take 1 capsule (40 mg) by mouth daily Take 30-60 minutes before a meal., Disp: 90 capsule, Rfl: 3     rosuvastatin (CRESTOR) 10 MG tablet, Take 1 tablet (10 mg) by mouth daily, Disp: 90 tablet, Rfl: 3     ketoconazole (NIZORAL) 2 % external shampoo, Apply topically once a week (Patient not taking: Reported on 5/10/2023), Disp: 120 mL, Rfl: 3    Current Facility-Administered Medications:      lidocaine 1 % injection 2 mL, 2  "mL, , , Oz Boone, , 2 mL at 02/08/22 1401     triamcinolone (KENALOG-40) injection 40 mg, 40 mg, , , Oz Boone, , 40 mg at 02/08/22 1401  Past Medical History:   Diagnosis Date     Allergic rhinitis due to animal dander      Allergy to mold spores     6/11/12 skin tests pos. for:  cat/dog/CR/DM/M/T/G/W     Bell's palsy      Diagnostic skin and sensitization tests 6/11/12 skin tests pos. for:  cat/dog/CR/DM/M/T/G/W     Need for desensitization to allergens IT 6/12 to 8/13 only-stopped by pt, no hx of rxn--IT for cat/dog/DM/M/T/G/W      Rhinitis, allergic to other allergen      Seasonal allergic conjunctivitis      Seasonal allergic rhinitis      Ancillary Procedure on 05/04/2023   Component Date Value Ref Range Status     LVEF  05/04/2023 60-65%   Final      Reviewed health maintenance  Patient Active Problem List   Diagnosis     CARDIOVASCULAR SCREENING; LDL GOAL LESS THAN 160     Dry eye syndrome     TB (tuberculosis), treated     Diagnostic skin and sensitization tests     Seasonal allergic rhinitis     Seasonal allergic conjunctivitis     Allergic rhinitis due to animal dander     Allergy to mold spores     Rhinitis, allergic to other allergen     Esophageal reflux     Advanced directives, counseling/discussion     Cough     Hemoptysis     Pulmonary tuberculosis     Gastroesophageal reflux disease without esophagitis     Snoring     Gastroesophageal reflux disease with esophagitis     High cholesterol         OBJECTIVE:  no apparent distress  /65   Pulse 71   Temp (!) 96.5  F (35.8  C) (Tympanic)   Resp 19   Ht 1.759 m (5' 9.25\")   Wt 68.5 kg (151 lb)   SpO2 99%   BMI 22.14 kg/m       Head: Normocephalic. No masses, lesions, tenderness or abnormalities.  Neck::Neck supple. No adenopathy. Thyroid symmetric, normal size.    Cardiovascular: negative. No lifts, heaves, or thrills. RRR. No murmurs, clicks gallops or rubs  Respiratory. Good diaphragmatic excursion. Lungs " clear  Gastrointestinal:Abdomen soft, non-tender.  No masses, organomegaly    Ancillary Procedure on 05/04/2023   Component Date Value Ref Range Status     LVEF  05/04/2023 60-65%   Final         ICD-10-CM    1. Aneurysm of ascending aorta without rupture (H)  I71.21 Adult Cardiology Eval [facility] Referral     carvedilol (COREG) 12.5 MG tablet      2. Essential hypertension with goal blood pressure less than 130/80  I10 carvedilol (COREG) 12.5 MG tablet       PLAN: patient will follow up with Cardiology for recommendation of how to follow aneurism.  Can his surveillance of the aortic be done by yearly ct scan that he gets for smoking or should he get an echocardiogram?

## 2023-05-24 ENCOUNTER — OFFICE VISIT (OUTPATIENT)
Dept: FAMILY MEDICINE | Facility: CLINIC | Age: 63
End: 2023-05-24
Payer: COMMERCIAL

## 2023-05-24 VITALS
HEIGHT: 69 IN | OXYGEN SATURATION: 99 % | SYSTOLIC BLOOD PRESSURE: 134 MMHG | WEIGHT: 153 LBS | DIASTOLIC BLOOD PRESSURE: 82 MMHG | RESPIRATION RATE: 16 BRPM | BODY MASS INDEX: 22.66 KG/M2 | HEART RATE: 60 BPM | TEMPERATURE: 96.2 F

## 2023-05-24 DIAGNOSIS — I71.21 ANEURYSM OF ASCENDING AORTA WITHOUT RUPTURE (H): ICD-10-CM

## 2023-05-24 DIAGNOSIS — I10 ESSENTIAL HYPERTENSION WITH GOAL BLOOD PRESSURE LESS THAN 130/80: ICD-10-CM

## 2023-05-24 PROCEDURE — 99213 OFFICE O/P EST LOW 20 MIN: CPT | Performed by: FAMILY MEDICINE

## 2023-05-24 RX ORDER — CARVEDILOL 12.5 MG/1
12.5 TABLET ORAL 2 TIMES DAILY WITH MEALS
Qty: 180 TABLET | Refills: 1 | Status: SHIPPED | OUTPATIENT
Start: 2023-05-24 | End: 2023-10-23

## 2023-05-24 ASSESSMENT — PAIN SCALES - GENERAL: PAINLEVEL: NO PAIN (0)

## 2023-05-24 NOTE — PROGRESS NOTES
5/24/2023     7:43 AM   Additional Questions   Roomed by Daniela VALDES   Accompanied by self     History of Present Illness       Hypertension: He presents for follow up of hypertension.  He does check blood pressure  regularly outside of the clinic. Outpatient blood pressures have not been over 140/90. He does not follow a low salt diet.     He eats 2-3 servings of fruits and vegetables daily.He consumes 0 sweetened beverage(s) daily.He exercises with enough effort to increase his heart rate 10 to 19 minutes per day.  He exercises with enough effort to increase his heart rate 6 days per week.   He is taking medications regularly.      SUBJECTIVE:  62 year oldyear old male enters with  hypertension.  Pt. Has been compliant with medications and medications were reviewed.  No side effects. No chest pain or sob. Low sodium diet.    Current Outpatient Medications:      augmented betamethasone dipropionate (DIPROLENE) 0.05 % external lotion, Apply topically 2 times daily, Disp: 60 mL, Rfl: 1     carvedilol (COREG) 12.5 MG tablet, Take 1 tablet (12.5 mg) by mouth 2 times daily (with meals), Disp: 180 tablet, Rfl: 1     fluticasone (FLONASE) 50 MCG/ACT nasal spray, Spray 2 sprays in nostril daily, Disp: 17 g, Rfl: 3     metFORMIN (GLUCOPHAGE XR) 500 MG 24 hr tablet, Take 1 tablet (500 mg) by mouth daily (with dinner), Disp: 90 tablet, Rfl: 1     omeprazole (PRILOSEC) 40 MG DR capsule, Take 1 capsule (40 mg) by mouth daily Take 30-60 minutes before a meal., Disp: 90 capsule, Rfl: 3     rosuvastatin (CRESTOR) 10 MG tablet, Take 1 tablet (10 mg) by mouth daily, Disp: 90 tablet, Rfl: 3     ketoconazole (NIZORAL) 2 % external shampoo, Apply topically once a week (Patient not taking: Reported on 5/10/2023), Disp: 120 mL, Rfl: 3    Current Facility-Administered Medications:      lidocaine 1 % injection 2 mL, 2 mL, , , Oz Boone, DO, 2 mL at 02/08/22 1401     triamcinolone (KENALOG-40) injection 40 mg, 40 mg, , ,  "Oz Boone, DO, 40 mg at 02/08/22 1401  Past Medical History:   Diagnosis Date     Allergic rhinitis due to animal dander      Allergy to mold spores     6/11/12 skin tests pos. for:  cat/dog/CR/DM/M/T/G/W     Bell's palsy      Diagnostic skin and sensitization tests 6/11/12 skin tests pos. for:  cat/dog/CR/DM/M/T/G/W     Essential hypertension with goal blood pressure less than 130/80 5/10/2023     Need for desensitization to allergens IT 6/12 to 8/13 only-stopped by pt, no hx of rxn--IT for cat/dog/DM/M/T/G/W      Rhinitis, allergic to other allergen      Seasonal allergic conjunctivitis      Seasonal allergic rhinitis      Ancillary Procedure on 05/04/2023   Component Date Value Ref Range Status     LVEF  05/04/2023 60-65%   Final      Reviewed health maintenance  Patient Active Problem List   Diagnosis     CARDIOVASCULAR SCREENING; LDL GOAL LESS THAN 160     Dry eye syndrome     TB (tuberculosis), treated     Diagnostic skin and sensitization tests     Seasonal allergic rhinitis     Seasonal allergic conjunctivitis     Allergic rhinitis due to animal dander     Allergy to mold spores     Rhinitis, allergic to other allergen     Esophageal reflux     Advanced directives, counseling/discussion     Cough     Hemoptysis     Pulmonary tuberculosis     Gastroesophageal reflux disease without esophagitis     Snoring     Gastroesophageal reflux disease with esophagitis     High cholesterol     Aneurysm of ascending aorta without rupture (H)     Essential hypertension with goal blood pressure less than 130/80     Prediabetes         OBJECTIVE:  no apparent distress  /82   Pulse 60   Temp (!) 96.2  F (35.7  C) (Tympanic)   Resp 16   Ht 1.759 m (5' 9.25\")   Wt 69.4 kg (153 lb)   SpO2 99%   BMI 22.43 kg/m       Head: Normocephalic. No masses, lesions, tenderness or abnormalities.  Neck::Neck supple. No adenopathy. Thyroid symmetric, normal size.    Cardiovascular: negative. No lifts, heaves, or " thrills. RRR. No murmurs, clicks gallops or rubs  Respiratory. Good diaphragmatic excursion. Lungs clear  Gastrointestinal:Abdomen soft, non-tender.  No masses, organomegaly    Ancillary Procedure on 05/04/2023   Component Date Value Ref Range Status     LVEF  05/04/2023 60-65%   Final         ICD-10-CM    1. Aneurysm of ascending aorta without rupture (H)  I71.21 carvedilol (COREG) 12.5 MG tablet      2. Essential hypertension with goal blood pressure less than 130/80  I10 carvedilol (COREG) 12.5 MG tablet       PLAN: Follow up in 6 months

## 2023-05-28 NOTE — PROGRESS NOTES
[facility]    Referring provider:Juan Jose Hargrove MD    HPI: Mr. Stacia Abernathy is a 62 year old  male with PMH significant for    -Aneurysm of the ascending aorta without rupture  -Hypertension  -Former smoker quit 2008 (25-year history of smoking    Patient is being seen today for aneurysm of the ascending aorta.  Echocardiogram 5/4/2023 showed ascending aorta mildly dilated at 4.1 cm.  Patient recently underwent CT chest lung cancer screening 4/20/2023 which showed 4.2 cm ascending aortic aneurysm, which overall appeared stable when compared with his prior scan performed in 2016. On presentation today, he reports feeling well and is without concerns. No family history of thoracic aneurysm.  Current medications   carvedilol 12.5 mg twice daily (recently stopped lisinopril)  metformin   Crestor 10 mg    Medications, personal, family, and social history reviewed with patient and revised.    PAST MEDICAL HISTORY:  Past Medical History:   Diagnosis Date     Allergic rhinitis due to animal dander      Allergy to mold spores     6/11/12 skin tests pos. for:  cat/dog/CR/DM/M/T/G/W     Bell's palsy      Diagnostic skin and sensitization tests 6/11/12 skin tests pos. for:  cat/dog/CR/DM/M/T/G/W     Essential hypertension with goal blood pressure less than 130/80 5/10/2023     Need for desensitization to allergens IT 6/12 to 8/13 only-stopped by pt, no hx of rxn--IT for cat/dog/DM/M/T/G/W      Rhinitis, allergic to other allergen      Seasonal allergic conjunctivitis      Seasonal allergic rhinitis        CURRENT MEDICATIONS:  Current Outpatient Medications   Medication Sig Dispense Refill     augmented betamethasone dipropionate (DIPROLENE) 0.05 % external lotion Apply topically 2 times daily 60 mL 1     carvedilol (COREG) 12.5 MG tablet Take 1 tablet (12.5 mg) by mouth 2 times daily (with meals) 180 tablet  1     fluticasone (FLONASE) 50 MCG/ACT nasal spray Spray 2 sprays in nostril daily 17 g 3     ketoconazole (NIZORAL) 2 % external shampoo Apply topically once a week (Patient not taking: Reported on 5/10/2023) 120 mL 3     metFORMIN (GLUCOPHAGE XR) 500 MG 24 hr tablet Take 1 tablet (500 mg) by mouth daily (with dinner) 90 tablet 1     omeprazole (PRILOSEC) 40 MG DR capsule Take 1 capsule (40 mg) by mouth daily Take 30-60 minutes before a meal. 90 capsule 3     rosuvastatin (CRESTOR) 10 MG tablet Take 1 tablet (10 mg) by mouth daily 90 tablet 3       PAST SURGICAL HISTORY:  Past Surgical History:   Procedure Laterality Date     COLONOSCOPY  3/4/2013    Procedure: COLONOSCOPY;  screening colonoscopy,;  Surgeon: Tai Monroe MD;  Location: [facility]     ESOPHAGOSCOPY, GASTROSCOPY, DUODENOSCOPY (EGD), COMBINED N/A 11/3/2014    Procedure: COMBINED ESOPHAGOSCOPY, GASTROSCOPY, DUODENOSCOPY (EGD), BIOPSY SINGLE OR MULTIPLE;  Surgeon: Renato Vasquez MD;  Location: [facility] OR       ALLERGIES:   No Known Allergies    FAMILY HISTORY:  Family History   Problem Relation Age of Onset     Hypertension Mother      Cancer Father         lung     Diabetes Sister      Diabetes Brother      Unknown/Adopted No family hx of      Macular Degeneration No family hx of      Glaucoma No family hx of      Thyroid Disease No family hx of      Cerebrovascular Disease No family hx of          SOCIAL HISTORY:  Social History     Tobacco Use     Smoking status: Former     Types: Cigarettes     Quit date: 2008     Years since quittin.7     Smokeless tobacco: Never   Vaping Use     Vaping status: Never Used   Substance Use Topics     Alcohol use: Yes     Comment: OCC     Drug use: No       ROS:   Constitutional: No fever, chills, or sweats. Weight stable.   Cardiovascular: As per HPI.       Exam:  /84 (BP Location: Left arm, Patient Position: Sitting, Cuff Size: Adult Regular)   Pulse 64   Wt 69.3 kg (152 lb 12.8 oz)   SpO2  97%   BMI 22.40 kg/m    GENERAL APPEARANCE: alert and no distress  HEENT: no icterus, no central cyanosis  LYMPH/NECK: no adenopathy, no asymmetry, JVP not elevated, no carotid bruits.  RESPIRATORY: lungs clear to auscultation - no rales, rhonchi or wheezes, no use of accessory muscles, no retractions, respirations are unlabored, normal respiratory rate  CARDIOVASCULAR: regular rhythm, normal S1, S2, no S3 or S4 and no murmur, click or rub, precordium quiet with normal PMI.  GI: soft, non tender  EXTREMITIES: no edema  NEURO: alert, normal speech,and affect  SKIN: no ecchymoses, no rashes     I have reviewed the labs and personally reviewed the imaging below and made my comment in the assessment and plan.    Labs:  CBC RESULTS:   Lab Results   Component Value Date    WBC 7.3 05/11/2022    WBC 6.6 02/16/2017    RBC 5.36 05/11/2022    RBC 5.46 02/16/2017    HGB 15.7 05/11/2022    HGB 15.6 02/16/2017    HCT 47.5 05/11/2022    HCT 47.8 02/16/2017    MCV 89 05/11/2022    MCV 88 02/16/2017    MCH 29.3 05/11/2022    MCH 28.6 02/16/2017    MCHC 33.1 05/11/2022    MCHC 32.6 02/16/2017    RDW 13.1 05/11/2022    RDW 12.9 02/16/2017     05/11/2022     02/16/2017       BMP RESULTS:  Lab Results   Component Value Date     04/06/2023     05/12/2021    POTASSIUM 3.8 04/06/2023    POTASSIUM 4.0 05/12/2021    CHLORIDE 105 04/06/2023    CHLORIDE 104 05/12/2021    CO2 28 04/06/2023    CO2 28 05/12/2021    ANIONGAP 4 04/06/2023    ANIONGAP 5 05/12/2021     (H) 04/06/2023     (H) 05/12/2021    BUN 14 04/06/2023    BUN 13 05/12/2021    CR 0.83 04/06/2023    CR 0.93 05/12/2021    GFRESTIMATED >90 04/06/2023    GFRESTIMATED 88 05/12/2021    GFRESTBLACK >90 05/12/2021    RADHA 9.1 04/06/2023    RADHA 9.0 05/12/2021      Echocardiogram 5/4/2023  Mild dilatation of the aorta is present.  No significant valvular abnormalities present.  Global and regional left ventricular function is normal with an EF of  60-65%.  Global right ventricular function is normal.  No pericardial effusion is present.  IVC diameter <2.1 cm collapsing >50% with sniff suggests a normal RA pressure  of 3 mmHg.  There is no prior study for direct comparison.        Assessment and Plan:   Patient recently underwent CT lung cancer screening a month ago which showed incidental finding of ascending aortic aneurysm.  Recent echocardiogram showed normal biventricular function.  Ascending aorta is 4.1 cm.    1.  Hypertension: Patient is currently on carvedilol 12.5 mg twice daily.  At present, given stability of his aortic aneurysm over ~7 years, would recommend using typical BP agents such as lisinopril, CCB, thiazide diuretic, etc; His BP is a little high today so we will restart his lisinopril at 5mg. He should follow up with his pcp as planned for intensification of med regimen for ongoing BP control.    -Continue carvedilol 12.5 mg twice daily  -Restart lisinopril 5 mg daily    2.  Ascending aortic aneurysm 4.1 cm: Likely related to age, hypertension and former smoking status.  Aortic valve is normal in structure and function. No family history  -can continue to use lung cancer screening CTs to monitor; if report of change in size (change of 0.5 cm/year is considered significant), can consider add'l imaging with echo vs contrasted CT to further evaluate (noncontrasted CT may overestimate aortic dimensions)    3.  Former smoker: He does yearly lung cancer screening which will be also a good modality to monitor ascending aorta.    The patient was seen and discussed with Dr. Garcia, who agrees with the above assessment and plan.    Return to clinic as needed.    Criss Lerma MD  Cardiology Fellow PGY5  P: [phone]    Attending note 5/31/2023:     I, Toña Garcia MD, saw this patient with the cardiology fellow Dr. Lerma and agree with the resident/fellow's findings and plan of care as documented in the note.       I personally reviewed vital  signs, medications, labs, imaging, and ECG .     The history and physical findings are accurate as recorded. My additional findings, if any, have been incorporated into the body of the note. The assessment and plans outlined reflect our joint decision making.     Total time spent today for this visit is 45 minutes including precharting, face-to-face clinic visit, review of labs/imaging and medical documentation.     Toña MORRIS MD  [facility] Division of Cardiology  Pager [phone]

## 2023-05-31 ENCOUNTER — OFFICE VISIT (OUTPATIENT)
Dept: CARDIOLOGY | Facility: CLINIC | Age: 63
End: 2023-05-31
Attending: FAMILY MEDICINE
Payer: COMMERCIAL

## 2023-05-31 VITALS
WEIGHT: 152.8 LBS | BODY MASS INDEX: 22.4 KG/M2 | OXYGEN SATURATION: 97 % | DIASTOLIC BLOOD PRESSURE: 85 MMHG | HEART RATE: 61 BPM | SYSTOLIC BLOOD PRESSURE: 125 MMHG

## 2023-05-31 DIAGNOSIS — I71.21 ANEURYSM OF ASCENDING AORTA WITHOUT RUPTURE (H): Primary | ICD-10-CM

## 2023-05-31 DIAGNOSIS — I10 PRIMARY HYPERTENSION: ICD-10-CM

## 2023-05-31 DIAGNOSIS — Z87.891 FORMER SMOKER: ICD-10-CM

## 2023-05-31 PROCEDURE — 99204 OFFICE O/P NEW MOD 45 MIN: CPT | Mod: GC | Performed by: INTERNAL MEDICINE

## 2023-05-31 RX ORDER — LISINOPRIL 5 MG/1
5 TABLET ORAL DAILY
Qty: 90 TABLET | Refills: 3 | Status: SHIPPED | OUTPATIENT
Start: 2023-05-31 | End: 2024-05-16

## 2023-05-31 NOTE — NURSING NOTE
"Chief Complaint   Patient presents with     New Patient     Reason for visit: New general cardiology for Aneurysm of ascending aorta without rupture. Referred       Initial /84 (BP Location: Left arm, Patient Position: Sitting, Cuff Size: Adult Regular)   Pulse 64   Wt 69.3 kg (152 lb 12.8 oz)   SpO2 97%   BMI 22.40 kg/m   Estimated body mass index is 22.4 kg/m  as calculated from the following:    Height as of 5/24/23: 1.759 m (5' 9.25\").    Weight as of this encounter: 69.3 kg (152 lb 12.8 oz)..  BP completed using cuff size: regular    BERNIE Rios    "

## 2023-05-31 NOTE — NURSING NOTE
Blood pressure and pulse rechecked; please see vitals section for newly recorded values.  NATALIE NewsomeA

## 2023-05-31 NOTE — LETTER
5/31/2023      RE: Stacia Abernathy  [address] [facility] [address]       Dear Colleague,    Thank you for the opportunity to participate in the care of your patient, Stacia Abernathy, at the [facility]. Please see a copy of my visit note below.                                                                                                 General Cardiology Clinic[facility]    Referring provider:Juan Jose Hargrove MD    HPI: Mr. Stacia Abernathy is a 62 year old  male with PMH significant for    -Aneurysm of the ascending aorta without rupture  -Hypertension  -Former smoker quit 2008 (25-year history of smoking    Patient is being seen today for aneurysm of the ascending aorta.  Echocardiogram 5/4/2023 showed ascending aorta mildly dilated at 4.1 cm.  Patient recently underwent CT chest lung cancer screening 4/20/2023 which showed 4.2 cm ascending aortic aneurysm, which overall appeared stable when compared with his prior scan performed in 2016. On presentation today, he reports feeling well and is without concerns. No family history of thoracic aneurysm.  Current medications   carvedilol 12.5 mg twice daily (recently stopped lisinopril)  metformin   Crestor 10 mg    Medications, personal, family, and social history reviewed with patient and revised.    PAST MEDICAL HISTORY:  Past Medical History:   Diagnosis Date    Allergic rhinitis due to animal dander     Allergy to mold spores     6/11/12 skin tests pos. for:  cat/dog/CR/DM/M/T/G/W    Bell's palsy     Diagnostic skin and sensitization tests 6/11/12 skin tests pos. for:  cat/dog/CR/DM/M/T/G/W    Essential hypertension with goal blood pressure less than 130/80 5/10/2023    Need for desensitization to allergens IT 6/12 to 8/13 only-stopped by pt, no hx of rxn--IT for cat/dog/DM/M/T/G/W     Rhinitis, allergic to other allergen     Seasonal allergic conjunctivitis     Seasonal allergic  rhinitis        CURRENT MEDICATIONS:  Current Outpatient Medications   Medication Sig Dispense Refill    augmented betamethasone dipropionate (DIPROLENE) 0.05 % external lotion Apply topically 2 times daily 60 mL 1    carvedilol (COREG) 12.5 MG tablet Take 1 tablet (12.5 mg) by mouth 2 times daily (with meals) 180 tablet 1    fluticasone (FLONASE) 50 MCG/ACT nasal spray Spray 2 sprays in nostril daily 17 g 3    ketoconazole (NIZORAL) 2 % external shampoo Apply topically once a week (Patient not taking: Reported on 5/10/2023) 120 mL 3    metFORMIN (GLUCOPHAGE XR) 500 MG 24 hr tablet Take 1 tablet (500 mg) by mouth daily (with dinner) 90 tablet 1    omeprazole (PRILOSEC) 40 MG DR capsule Take 1 capsule (40 mg) by mouth daily Take 30-60 minutes before a meal. 90 capsule 3    rosuvastatin (CRESTOR) 10 MG tablet Take 1 tablet (10 mg) by mouth daily 90 tablet 3       PAST SURGICAL HISTORY:  Past Surgical History:   Procedure Laterality Date    COLONOSCOPY  3/4/2013    Procedure: COLONOSCOPY;  screening colonoscopy,;  Surgeon: Tai Monroe MD;  Location: [facility] OR    ESOPHAGOSCOPY, GASTROSCOPY, DUODENOSCOPY (EGD), COMBINED N/A 11/3/2014    Procedure: COMBINED ESOPHAGOSCOPY, GASTROSCOPY, DUODENOSCOPY (EGD), BIOPSY SINGLE OR MULTIPLE;  Surgeon: Renato Vasquez MD;  Location: [facility] OR       ALLERGIES:   No Known Allergies    FAMILY HISTORY:  Family History   Problem Relation Age of Onset    Hypertension Mother     Cancer Father         lung    Diabetes Sister     Diabetes Brother     Unknown/Adopted No family hx of     Macular Degeneration No family hx of     Glaucoma No family hx of     Thyroid Disease No family hx of     Cerebrovascular Disease No family hx of          SOCIAL HISTORY:  Social History     Tobacco Use    Smoking status: Former     Types: Cigarettes     Quit date: 2008     Years since quittin.7    Smokeless tobacco: Never   Vaping Use    Vaping status: Never Used   Substance Use Topics     Alcohol use: Yes     Comment: OCC    Drug use: No       ROS:   Constitutional: No fever, chills, or sweats. Weight stable.   Cardiovascular: As per HPI.       Exam:  /84 (BP Location: Left arm, Patient Position: Sitting, Cuff Size: Adult Regular)   Pulse 64   Wt 69.3 kg (152 lb 12.8 oz)   SpO2 97%   BMI 22.40 kg/m    GENERAL APPEARANCE: alert and no distress  HEENT: no icterus, no central cyanosis  LYMPH/NECK: no adenopathy, no asymmetry, JVP not elevated, no carotid bruits.  RESPIRATORY: lungs clear to auscultation - no rales, rhonchi or wheezes, no use of accessory muscles, no retractions, respirations are unlabored, normal respiratory rate  CARDIOVASCULAR: regular rhythm, normal S1, S2, no S3 or S4 and no murmur, click or rub, precordium quiet with normal PMI.  GI: soft, non tender  EXTREMITIES: no edema  NEURO: alert, normal speech,and affect  SKIN: no ecchymoses, no rashes     I have reviewed the labs and personally reviewed the imaging below and made my comment in the assessment and plan.    Labs:  CBC RESULTS:   Lab Results   Component Value Date    WBC 7.3 05/11/2022    WBC 6.6 02/16/2017    RBC 5.36 05/11/2022    RBC 5.46 02/16/2017    HGB 15.7 05/11/2022    HGB 15.6 02/16/2017    HCT 47.5 05/11/2022    HCT 47.8 02/16/2017    MCV 89 05/11/2022    MCV 88 02/16/2017    MCH 29.3 05/11/2022    MCH 28.6 02/16/2017    MCHC 33.1 05/11/2022    MCHC 32.6 02/16/2017    RDW 13.1 05/11/2022    RDW 12.9 02/16/2017     05/11/2022     02/16/2017       BMP RESULTS:  Lab Results   Component Value Date     04/06/2023     05/12/2021    POTASSIUM 3.8 04/06/2023    POTASSIUM 4.0 05/12/2021    CHLORIDE 105 04/06/2023    CHLORIDE 104 05/12/2021    CO2 28 04/06/2023    CO2 28 05/12/2021    ANIONGAP 4 04/06/2023    ANIONGAP 5 05/12/2021     (H) 04/06/2023     (H) 05/12/2021    BUN 14 04/06/2023    BUN 13 05/12/2021    CR 0.83 04/06/2023    CR 0.93 05/12/2021    GFRESTIMATED >90  04/06/2023    GFRESTIMATED 88 05/12/2021    GFRESTBLACK >90 05/12/2021    RADHA 9.1 04/06/2023    RADHA 9.0 05/12/2021      Echocardiogram 5/4/2023  Mild dilatation of the aorta is present.  No significant valvular abnormalities present.  Global and regional left ventricular function is normal with an EF of 60-65%.  Global right ventricular function is normal.  No pericardial effusion is present.  IVC diameter <2.1 cm collapsing >50% with sniff suggests a normal RA pressure  of 3 mmHg.  There is no prior study for direct comparison.        Assessment and Plan:   Patient recently underwent CT lung cancer screening a month ago which showed incidental finding of ascending aortic aneurysm.  Recent echocardiogram showed normal biventricular function.  Ascending aorta is 4.1 cm.    1.  Hypertension: Patient is currently on carvedilol 12.5 mg twice daily.  At present, given stability of his aortic aneurysm over ~7 years, would recommend using typical BP agents such as lisinopril, CCB, thiazide diuretic, etc; His BP is a little high today so we will restart his lisinopril at 5mg. He should follow up with his pcp as planned for intensification of med regimen for ongoing BP control.    -Continue carvedilol 12.5 mg twice daily  -Restart lisinopril 5 mg daily    2.  Ascending aortic aneurysm 4.1 cm: Likely related to age, hypertension and former smoking status.  Aortic valve is normal in structure and function. No family history  -can continue to use lung cancer screening CTs to monitor; if report of change in size (change of 0.5 cm/year is considered significant), can consider add'l imaging with echo vs contrasted CT to further evaluate (noncontrasted CT may overestimate aortic dimensions)    3.  Former smoker: He does yearly lung cancer screening which will be also a good modality to monitor ascending aorta.    The patient was seen and discussed with Dr. Garcia, who agrees with the above assessment and plan.    Return to clinic as  needed.    Criss Lerma MD  Cardiology Fellow PGY5  P: [phone]    Attending note 5/31/2023:     I, Toña Morris MD, saw this patient with the cardiology fellow Dr. Lerma and agree with the resident/fellow's findings and plan of care as documented in the note.       I personally reviewed vital signs, medications, labs, imaging, and ECG .     The history and physical findings are accurate as recorded. My additional findings, if any, have been incorporated into the body of the note. The assessment and plans outlined reflect our joint decision making.     Total time spent today for this visit is 45 minutes including precharting, face-to-face clinic visit, review of labs/imaging and medical documentation.     Toña MORRIS MD  [facility] Division of Cardiology  Pager [phone]         Please do not hesitate to contact me if you have any questions/concerns.     Sincerely,     Toña Morris MD

## 2023-05-31 NOTE — PATIENT INSTRUCTIONS
Thank you for coming to the [facility] at [place]; please note the following instructions:    1. Please continue taking Carvedilol    2. MEDICATION CHANGES TODAY:    * RESTART Lisinopril 5 mg; a prescription has been sent to your preferred pharmacy.    3.  Dr. Toña Garcia is recommending to see you on an as needed basis regarding your heart; please follow up with your primary care provider.  It was a pleasuring seeing you today at the [facility] @ the [facility].      If you have any questions regarding your visit, please contact your care team:     CARDIOLOGY  TELEPHONE NUMBER   Beulah FRIED., Registered Nurse  Brenda SINGLETON, Registered Nurse  Maria Antonia MELCHOR, Registered Nurse  Xiomy HO, Registered Medical Assistant  Gabrielle HOANG, Certified Medical Assistant  Franchesca KLEIN, Visit Facilitator [phone] (select option 1)    *After hours: [phone]   For Scheduling Appts:     [phone] (select option 1)    *After hours: [phone]   For the Device Clinic (Pacemakers and ICD's)  Esperanza DENTON, Registered Nurse   During business hours: [phone]    *After business hours:  [phone] (select option 4)      Normal test result notifications will be released via [employer] or mailed within 7 business days.  All other test results, will be communicated via telephone once reviewed by your cardiologist.    If you need a medication refill, please contact your pharmacy.  Please allow 3 business days for your refill to be completed.    As always, thank you for trusting us with your health care needs!

## 2023-09-13 DIAGNOSIS — I10 ESSENTIAL HYPERTENSION WITH GOAL BLOOD PRESSURE LESS THAN 130/80: Primary | ICD-10-CM

## 2023-09-13 DIAGNOSIS — R73.03 PREDIABETES: ICD-10-CM

## 2023-09-13 NOTE — PROGRESS NOTES
Needs fasting pre visit lab before 10/26 Please call patient to set up afasting lab appointment.

## 2023-09-13 NOTE — PROGRESS NOTES
Called and left message for patient to call back, and sent a Mychart message.Valentina MONTES [facility]

## 2023-10-23 ENCOUNTER — LAB (OUTPATIENT)
Dept: LAB | Facility: CLINIC | Age: 63
End: 2023-10-23
Payer: COMMERCIAL

## 2023-10-23 DIAGNOSIS — R73.03 PREDIABETES: ICD-10-CM

## 2023-10-23 DIAGNOSIS — I10 ESSENTIAL HYPERTENSION WITH GOAL BLOOD PRESSURE LESS THAN 130/80: ICD-10-CM

## 2023-10-23 LAB
ALBUMIN SERPL BCG-MCNC: 4.7 G/DL (ref 3.5–5.2)
ALP SERPL-CCNC: 65 U/L (ref 40–129)
ALT SERPL W P-5'-P-CCNC: 43 U/L (ref 0–70)
ANION GAP SERPL CALCULATED.3IONS-SCNC: 9 MMOL/L (ref 7–15)
AST SERPL W P-5'-P-CCNC: 35 U/L (ref 0–45)
BILIRUB SERPL-MCNC: 0.7 MG/DL
BUN SERPL-MCNC: 13.5 MG/DL (ref 8–23)
CALCIUM SERPL-MCNC: 10 MG/DL (ref 8.8–10.2)
CHLORIDE SERPL-SCNC: 102 MMOL/L (ref 98–107)
CHOLEST SERPL-MCNC: 139 MG/DL
CREAT SERPL-MCNC: 0.98 MG/DL (ref 0.67–1.17)
DEPRECATED HCO3 PLAS-SCNC: 27 MMOL/L (ref 22–29)
EGFRCR SERPLBLD CKD-EPI 2021: 87 ML/MIN/1.73M2
GLUCOSE SERPL-MCNC: 102 MG/DL (ref 70–99)
HBA1C MFR BLD: 5.8 % (ref 0–5.6)
HDLC SERPL-MCNC: 48 MG/DL
LDLC SERPL CALC-MCNC: 68 MG/DL
NONHDLC SERPL-MCNC: 91 MG/DL
POTASSIUM SERPL-SCNC: 4.6 MMOL/L (ref 3.4–5.3)
PROT SERPL-MCNC: 7.3 G/DL (ref 6.4–8.3)
SODIUM SERPL-SCNC: 138 MMOL/L (ref 135–145)
TRIGL SERPL-MCNC: 115 MG/DL

## 2023-10-23 PROCEDURE — 80053 COMPREHEN METABOLIC PANEL: CPT

## 2023-10-23 PROCEDURE — 36415 COLL VENOUS BLD VENIPUNCTURE: CPT

## 2023-10-23 PROCEDURE — 83036 HEMOGLOBIN GLYCOSYLATED A1C: CPT

## 2023-10-23 PROCEDURE — 80061 LIPID PANEL: CPT

## 2023-10-25 ENCOUNTER — OFFICE VISIT (OUTPATIENT)
Dept: FAMILY MEDICINE | Facility: CLINIC | Age: 63
End: 2023-10-25
Payer: COMMERCIAL

## 2023-10-25 VITALS
OXYGEN SATURATION: 99 % | DIASTOLIC BLOOD PRESSURE: 80 MMHG | TEMPERATURE: 97.6 F | SYSTOLIC BLOOD PRESSURE: 126 MMHG | RESPIRATION RATE: 17 BRPM | WEIGHT: 156.7 LBS | HEART RATE: 58 BPM | BODY MASS INDEX: 23.21 KG/M2 | HEIGHT: 69 IN

## 2023-10-25 DIAGNOSIS — I10 ESSENTIAL HYPERTENSION WITH GOAL BLOOD PRESSURE LESS THAN 130/80: ICD-10-CM

## 2023-10-25 DIAGNOSIS — R73.03 PREDIABETES: ICD-10-CM

## 2023-10-25 DIAGNOSIS — I71.21 ANEURYSM OF ASCENDING AORTA WITHOUT RUPTURE (H): ICD-10-CM

## 2023-10-25 PROCEDURE — 99214 OFFICE O/P EST MOD 30 MIN: CPT | Performed by: FAMILY MEDICINE

## 2023-10-25 RX ORDER — CARVEDILOL 12.5 MG/1
12.5 TABLET ORAL 2 TIMES DAILY WITH MEALS
Qty: 180 TABLET | Refills: 1 | Status: SHIPPED | OUTPATIENT
Start: 2023-10-25 | End: 2024-05-12

## 2023-10-25 RX ORDER — METFORMIN HCL 500 MG
500 TABLET, EXTENDED RELEASE 24 HR ORAL
Qty: 90 TABLET | Refills: 1 | Status: SHIPPED | OUTPATIENT
Start: 2023-10-25 | End: 2024-06-02

## 2023-10-25 ASSESSMENT — PAIN SCALES - GENERAL: PAINLEVEL: NO PAIN (0)

## 2023-10-25 NOTE — LETTER
October 25, 2023      Stacia Abernathy  [address] [facility] [address]        To Whom It May Concern:    Stacia Abernathy was seen in our clinic. He may return to work with the following: he should not reach  to the [employer] for medical reasons    Sincerely,        Juan Jose Hargrove MD

## 2023-10-25 NOTE — PROGRESS NOTES
Lucia Palomo is a 62 year old, presenting for the following health issues:  Recheck Medication and Hypertension        10/25/2023     9:05 AM   Additional Questions   Roomed by Sudeep LEBLANC LPN   Accompanied by Self         10/25/2023     9:05 AM   Patient Reported Additional Medications   Patient reports taking the following new medications none       History of Present Illness       Hypertension: He presents for follow up of hypertension.  He does check blood pressure  regularly outside of the clinic. Outpatient blood pressures have not been over 140/90. He follows a low salt diet.     He eats 2-3 servings of fruits and vegetables daily.He consumes 0 sweetened beverage(s) daily.He exercises with enough effort to increase his heart rate 20 to 29 minutes per day.  He exercises with enough effort to increase his heart rate 4 days per week.   He is taking medications regularly.   SUBJECTIVE:  62 year oldyear old male enters with  hypertension.  Pt. Has been compliant with medications and medications were reviewed.  No side effects. No chest pain or sob. Low sodium diet.    Current Outpatient Medications:     augmented betamethasone dipropionate (DIPROLENE) 0.05 % external lotion, Apply topically 2 times daily, Disp: 60 mL, Rfl: 1    carvedilol (COREG) 12.5 MG tablet, Take 1 tablet (12.5 mg) by mouth 2 times daily (with meals), Disp: 180 tablet, Rfl: 1    lisinopril (ZESTRIL) 5 MG tablet, Take 1 tablet (5 mg) by mouth daily, Disp: 90 tablet, Rfl: 3    metFORMIN (GLUCOPHAGE XR) 500 MG 24 hr tablet, Take 1 tablet (500 mg) by mouth daily (with dinner), Disp: 90 tablet, Rfl: 1    omeprazole (PRILOSEC) 40 MG DR capsule, Take 1 capsule (40 mg) by mouth daily Take 30-60 minutes before a meal., Disp: 90 capsule, Rfl: 3    rosuvastatin (CRESTOR) 10 MG tablet, Take 1 tablet (10 mg) by mouth daily, Disp: 90 tablet, Rfl: 3    Current Facility-Administered Medications:     lidocaine 1 % injection 2 mL, 2 mL, , , Ahrenholz,  Oz Hirsch, , 2 mL at 02/08/22 1401    triamcinolone (KENALOG-40) injection 40 mg, 40 mg, , , MelyOz Hirsch, DO, 40 mg at 02/08/22 1401  Past Medical History:   Diagnosis Date    Allergic rhinitis due to animal dander     Allergy to mold spores     6/11/12 skin tests pos. for:  cat/dog/CR/DM/M/T/G/W    Bell's palsy     Diagnostic skin and sensitization tests 6/11/12 skin tests pos. for:  cat/dog/CR/DM/M/T/G/W    Essential hypertension with goal blood pressure less than 130/80 5/10/2023    Need for desensitization to allergens IT 6/12 to 8/13 only-stopped by pt, no hx of rxn--IT for cat/dog/DM/M/T/G/W     Rhinitis, allergic to other allergen     Seasonal allergic conjunctivitis     Seasonal allergic rhinitis      Lab on 10/23/2023   Component Date Value Ref Range Status    Hemoglobin A1C 10/23/2023 5.8 (H)  0.0 - 5.6 % Final    Normal <5.7%   Prediabetes 5.7-6.4%    Diabetes 6.5% or higher     Note: Adopted from ADA consensus guidelines.    Sodium 10/23/2023 138  135 - 145 mmol/L Final    Reference intervals for this test were updated on 09/26/2023 to more accurately reflect our healthy population. There may be differences in the flagging of prior results with similar values performed with this method. Interpretation of those prior results can be made in the context of the updated reference intervals.     Potassium 10/23/2023 4.6  3.4 - 5.3 mmol/L Final    Carbon Dioxide (CO2) 10/23/2023 27  22 - 29 mmol/L Final    Anion Gap 10/23/2023 9  7 - 15 mmol/L Final    Urea Nitrogen 10/23/2023 13.5  8.0 - 23.0 mg/dL Final    Creatinine 10/23/2023 0.98  0.67 - 1.17 mg/dL Final    GFR Estimate 10/23/2023 87  >60 mL/min/1.73m2 Final    Calcium 10/23/2023 10.0  8.8 - 10.2 mg/dL Final    Chloride 10/23/2023 102  98 - 107 mmol/L Final    Glucose 10/23/2023 102 (H)  70 - 99 mg/dL Final    Alkaline Phosphatase 10/23/2023 65  40 - 129 U/L Final    AST 10/23/2023 35  0 - 45 U/L Final    Reference intervals for this test were  updated on 6/12/2023 to more accurately reflect our healthy population. There may be differences in the flagging of prior results with similar values performed with this method. Interpretation of those prior results can be made in the context of the updated reference intervals.    ALT 10/23/2023 43  0 - 70 U/L Final    Reference intervals for this test were updated on 6/12/2023 to more accurately reflect our healthy population. There may be differences in the flagging of prior results with similar values performed with this method. Interpretation of those prior results can be made in the context of the updated reference intervals.      Protein Total 10/23/2023 7.3  6.4 - 8.3 g/dL Final    Albumin 10/23/2023 4.7  3.5 - 5.2 g/dL Final    Bilirubin Total 10/23/2023 0.7  <=1.2 mg/dL Final    Cholesterol 10/23/2023 139  <200 mg/dL Final    Triglycerides 10/23/2023 115  <150 mg/dL Final    Direct Measure HDL 10/23/2023 48  >=40 mg/dL Final    LDL Cholesterol Calculated 10/23/2023 68  <=100 mg/dL Final    Non HDL Cholesterol 10/23/2023 91  <130 mg/dL Final      Reviewed health maintenance  Patient Active Problem List   Diagnosis    CARDIOVASCULAR SCREENING; LDL GOAL LESS THAN 160    Dry eye syndrome    TB (tuberculosis), treated    Diagnostic skin and sensitization tests    Seasonal allergic rhinitis    Seasonal allergic conjunctivitis    Allergic rhinitis due to animal dander    Allergy to mold spores    Rhinitis, allergic to other allergen    Esophageal reflux    Advanced directives, counseling/discussion    Cough    Hemoptysis    Pulmonary tuberculosis    Gastroesophageal reflux disease without esophagitis    Snoring    Gastroesophageal reflux disease with esophagitis    High cholesterol    Aneurysm of ascending aorta without rupture (H24)    Essential hypertension with goal blood pressure less than 130/80    Prediabetes         OBJECTIVE:  no apparent distress  /80   Pulse 58   Temp 97.6  F (36.4  C) (Tympanic)  "  Resp 17   Ht 1.753 m (5' 9\")   Wt 71.1 kg (156 lb 11.2 oz)   SpO2 99%   BMI 23.14 kg/m       Head: Normocephalic. No masses, lesions, tenderness or abnormalities.  Neck::Neck supple. No adenopathy. Thyroid symmetric, normal size.    Cardiovascular: negative. No lifts, heaves, or thrills. RRR. No murmurs, clicks gallops or rubs  Respiratory. Good diaphragmatic excursion. Lungs clear  Gastrointestinal:Abdomen soft, non-tender.  No masses, organomegaly    Lab on 10/23/2023   Component Date Value Ref Range Status    Hemoglobin A1C 10/23/2023 5.8 (H)  0.0 - 5.6 % Final    Normal <5.7%   Prediabetes 5.7-6.4%    Diabetes 6.5% or higher     Note: Adopted from ADA consensus guidelines.    Sodium 10/23/2023 138  135 - 145 mmol/L Final    Reference intervals for this test were updated on 09/26/2023 to more accurately reflect our healthy population. There may be differences in the flagging of prior results with similar values performed with this method. Interpretation of those prior results can be made in the context of the updated reference intervals.     Potassium 10/23/2023 4.6  3.4 - 5.3 mmol/L Final    Carbon Dioxide (CO2) 10/23/2023 27  22 - 29 mmol/L Final    Anion Gap 10/23/2023 9  7 - 15 mmol/L Final    Urea Nitrogen 10/23/2023 13.5  8.0 - 23.0 mg/dL Final    Creatinine 10/23/2023 0.98  0.67 - 1.17 mg/dL Final    GFR Estimate 10/23/2023 87  >60 mL/min/1.73m2 Final    Calcium 10/23/2023 10.0  8.8 - 10.2 mg/dL Final    Chloride 10/23/2023 102  98 - 107 mmol/L Final    Glucose 10/23/2023 102 (H)  70 - 99 mg/dL Final    Alkaline Phosphatase 10/23/2023 65  40 - 129 U/L Final    AST 10/23/2023 35  0 - 45 U/L Final    Reference intervals for this test were updated on 6/12/2023 to more accurately reflect our healthy population. There may be differences in the flagging of prior results with similar values performed with this method. Interpretation of those prior results can be made in the context of the updated reference " intervals.    ALT 10/23/2023 43  0 - 70 U/L Final    Reference intervals for this test were updated on 6/12/2023 to more accurately reflect our healthy population. There may be differences in the flagging of prior results with similar values performed with this method. Interpretation of those prior results can be made in the context of the updated reference intervals.      Protein Total 10/23/2023 7.3  6.4 - 8.3 g/dL Final    Albumin 10/23/2023 4.7  3.5 - 5.2 g/dL Final    Bilirubin Total 10/23/2023 0.7  <=1.2 mg/dL Final    Cholesterol 10/23/2023 139  <200 mg/dL Final    Triglycerides 10/23/2023 115  <150 mg/dL Final    Direct Measure HDL 10/23/2023 48  >=40 mg/dL Final    LDL Cholesterol Calculated 10/23/2023 68  <=100 mg/dL Final    Non HDL Cholesterol 10/23/2023 91  <130 mg/dL Final         ICD-10-CM    1. Aneurysm of ascending aorta without rupture (H24)  I71.21 carvedilol (COREG) 12.5 MG tablet      2. Essential hypertension with goal blood pressure less than 130/80  I10 carvedilol (COREG) 12.5 MG tablet      3. Prediabetes  R73.03 metFORMIN (GLUCOPHAGE XR) 500 MG 24 hr tablet       PLAN: Follow up in 6 months

## 2023-10-28 DIAGNOSIS — R21 RASH: ICD-10-CM

## 2023-10-29 RX ORDER — BETAMETHASONE DIPROPIONATE 0.5 MG/ML
LOTION, AUGMENTED TOPICAL 2 TIMES DAILY
Qty: 60 ML | Refills: 1 | Status: SHIPPED | OUTPATIENT
Start: 2023-10-29 | End: 2024-01-04

## 2023-12-20 ENCOUNTER — OFFICE VISIT (OUTPATIENT)
Dept: URGENT CARE | Facility: URGENT CARE | Age: 63
End: 2023-12-20
Payer: COMMERCIAL

## 2023-12-20 VITALS
TEMPERATURE: 99 F | DIASTOLIC BLOOD PRESSURE: 81 MMHG | SYSTOLIC BLOOD PRESSURE: 126 MMHG | HEART RATE: 69 BPM | RESPIRATION RATE: 14 BRPM | BODY MASS INDEX: 22.68 KG/M2 | WEIGHT: 153.6 LBS | OXYGEN SATURATION: 98 %

## 2023-12-20 DIAGNOSIS — J02.9 ACUTE PHARYNGITIS, UNSPECIFIED ETIOLOGY: Primary | ICD-10-CM

## 2023-12-20 LAB
DEPRECATED S PYO AG THROAT QL EIA: NEGATIVE
FLUAV AG SPEC QL IA: NEGATIVE
FLUBV AG SPEC QL IA: NEGATIVE
GROUP A STREP BY PCR: NOT DETECTED

## 2023-12-20 PROCEDURE — 87804 INFLUENZA ASSAY W/OPTIC: CPT | Performed by: PHYSICIAN ASSISTANT

## 2023-12-20 PROCEDURE — 99214 OFFICE O/P EST MOD 30 MIN: CPT | Performed by: PHYSICIAN ASSISTANT

## 2023-12-20 PROCEDURE — 87635 SARS-COV-2 COVID-19 AMP PRB: CPT | Performed by: PHYSICIAN ASSISTANT

## 2023-12-20 PROCEDURE — 87651 STREP A DNA AMP PROBE: CPT | Performed by: PHYSICIAN ASSISTANT

## 2023-12-20 NOTE — LETTER
[facility] [place]  [address] [facility] [address]  Phone: [phone]    December 20, 2023        Stacia Abernathy  [address] [facility] [address]          To whom it may concern:    RE: Stacia Abernathy    Patient was seen and treated today at our clinic. Patient must be fever free for 24 hours and have an improvement in symptoms before returning to work. Please excuse from work missed during this time. If must be absent beyond December 25, 20233 they'll need to be seen for further evaluation.       Please contact me for questions or concerns.      Sincerely,      Denise Lyons

## 2023-12-20 NOTE — PROGRESS NOTES
Assessment & Plan     Acute pharyngitis, unspecified etiology  - Streptococcus A Rapid Screen w/Reflex to PCR - Clinic Collect  - Symptomatic COVID-19 Virus (Coronavirus) by PCR Nose  - Influenza A & B Antigen - Clinic Collect  - Group A Streptococcus PCR Throat Swab    Strep and influenza negative, COVID pending.  We discussed symptomatic measures including plenty of fluids and rest, Tylenol, ibuprofen.  Honey lemon tea and salt water gargles.  Follow-up to be seen if symptoms significantly worsen or fail to improve.    Return in about 1 week (around 12/27/2023) for visit with primary care provider if not improving.     VALENTIN Cartagena [facility]    Subjective   Stacia Abernathy is a 63 year old who presents for the following health issues     Patient presents with:  Cough: Dry cough, sore throat, mild fever, headaches. Sx started Sunday.       MILLY Palomo presents clinic today for evaluation of a sore throat.  Symptoms first began 3 days ago.  After his sore throat, he developed a headache and a cough that feels like is coming from an irritation in his throat.  No fever, body aches or chills but he has been tired.  Overall, symptoms do seem to be minimally improving.      Review of Systems   ROS negative except as stated above.      Objective    /81 (BP Location: Right arm, Cuff Size: Adult Regular)   Pulse 69   Temp 99  F (37.2  C) (Tympanic)   Resp 14   Wt 69.7 kg (153 lb 9.6 oz)   SpO2 98%   BMI 22.68 kg/m    Physical Exam   GENERAL: healthy, alert and no distress  EYES: Eyes grossly normal to inspection, PERRL and conjunctivae and sclerae normal  HENT: ear canals and TM's normal, nose and mouth without ulcers or lesions  NECK: mild bilateral anterior cervical adenopathy, no asymmetry, masses, or scars and thyroid normal to palpation  RESP: lungs clear to auscultation - no rales, rhonchi or wheezes  CV: regular rate and rhythm, normal S1 S2, no S3 or S4, no murmur, click or  rub, no peripheral edema and peripheral pulses strong    Results for orders placed or performed in visit on 12/20/23   Streptococcus A Rapid Screen w/Reflex to PCR - Clinic Collect     Status: Normal    Specimen: Throat; Swab   Result Value Ref Range    Group A Strep antigen Negative Negative   Influenza A & B Antigen - Clinic Collect     Status: Normal    Specimen: Nose; Swab   Result Value Ref Range    Influenza A antigen Negative Negative    Influenza B antigen Negative Negative    Narrative    Test results must be correlated with clinical data. If necessary, results should be confirmed by a molecular assay or viral culture.

## 2023-12-21 LAB — SARS-COV-2 RNA RESP QL NAA+PROBE: NEGATIVE

## 2024-01-04 DIAGNOSIS — R21 RASH: ICD-10-CM

## 2024-01-04 RX ORDER — BETAMETHASONE DIPROPIONATE 0.5 MG/ML
LOTION, AUGMENTED TOPICAL 2 TIMES DAILY
Qty: 60 ML | Refills: 1 | Status: SHIPPED | OUTPATIENT
Start: 2024-01-04 | End: 2024-03-03

## 2024-02-19 DIAGNOSIS — E78.00 HIGH CHOLESTEROL: ICD-10-CM

## 2024-02-19 RX ORDER — ROSUVASTATIN CALCIUM 10 MG/1
10 TABLET, COATED ORAL DAILY
Qty: 90 TABLET | Refills: 3 | Status: SHIPPED | OUTPATIENT
Start: 2024-02-19

## 2024-03-02 DIAGNOSIS — R21 RASH: ICD-10-CM

## 2024-03-03 RX ORDER — BETAMETHASONE DIPROPIONATE 0.5 MG/ML
LOTION, AUGMENTED TOPICAL 2 TIMES DAILY
Qty: 60 ML | Refills: 1 | Status: SHIPPED | OUTPATIENT
Start: 2024-03-03 | End: 2024-04-30

## 2024-04-22 DIAGNOSIS — K21.9 GASTROESOPHAGEAL REFLUX DISEASE WITHOUT ESOPHAGITIS: ICD-10-CM

## 2024-04-22 RX ORDER — OMEPRAZOLE 40 MG/1
40 CAPSULE, DELAYED RELEASE ORAL DAILY
Qty: 90 CAPSULE | Refills: 0 | Status: SHIPPED | OUTPATIENT
Start: 2024-04-22 | End: 2024-07-19

## 2024-04-30 DIAGNOSIS — R21 RASH: ICD-10-CM

## 2024-04-30 RX ORDER — BETAMETHASONE DIPROPIONATE 0.5 MG/ML
LOTION, AUGMENTED TOPICAL 2 TIMES DAILY
Qty: 60 ML | Refills: 1 | Status: SHIPPED | OUTPATIENT
Start: 2024-04-30 | End: 2024-06-30

## 2024-05-09 DIAGNOSIS — I10 PRIMARY HYPERTENSION: ICD-10-CM

## 2024-05-12 DIAGNOSIS — I10 ESSENTIAL HYPERTENSION WITH GOAL BLOOD PRESSURE LESS THAN 130/80: ICD-10-CM

## 2024-05-12 DIAGNOSIS — I71.21 ANEURYSM OF ASCENDING AORTA WITHOUT RUPTURE (H): ICD-10-CM

## 2024-05-12 RX ORDER — CARVEDILOL 12.5 MG/1
12.5 TABLET ORAL 2 TIMES DAILY WITH MEALS
Qty: 180 TABLET | Refills: 1 | Status: SHIPPED | OUTPATIENT
Start: 2024-05-12

## 2024-05-16 RX ORDER — LISINOPRIL 5 MG/1
5 TABLET ORAL DAILY
Qty: 90 TABLET | Refills: 0 | Status: SHIPPED | OUTPATIENT
Start: 2024-05-16 | End: 2024-08-19

## 2024-05-16 NOTE — TELEPHONE ENCOUNTER
lisinopril (ZESTRIL) 5 MG tablet 90 tablet 3 5/31/2023       Last Office Visit: 5/31/23  Future Office visit:   none    Return to clinic as needed.     ACE Inhibitors (Including Combos) Protocol Passed   Note to pharmacy to get future refills from PCP    Loreta Carter RN  P Red Flag Triage/MRT

## 2024-06-01 DIAGNOSIS — R73.03 PREDIABETES: ICD-10-CM

## 2024-06-02 RX ORDER — METFORMIN HCL 500 MG
500 TABLET, EXTENDED RELEASE 24 HR ORAL
Qty: 90 TABLET | Refills: 1 | Status: SHIPPED | OUTPATIENT
Start: 2024-06-02

## 2024-06-17 PROBLEM — Z71.89 ADVANCED DIRECTIVES, COUNSELING/DISCUSSION: Status: RESOLVED | Noted: 2017-02-16 | Resolved: 2024-06-17

## 2024-06-29 ENCOUNTER — HEALTH MAINTENANCE LETTER (OUTPATIENT)
Age: 64
End: 2024-06-29

## 2024-06-29 DIAGNOSIS — R21 RASH: ICD-10-CM

## 2024-06-30 RX ORDER — BETAMETHASONE DIPROPIONATE 0.5 MG/ML
LOTION, AUGMENTED TOPICAL 2 TIMES DAILY
Qty: 60 ML | Refills: 1 | Status: SHIPPED | OUTPATIENT
Start: 2024-06-30 | End: 2024-09-06

## 2024-07-19 DIAGNOSIS — K21.9 GASTROESOPHAGEAL REFLUX DISEASE WITHOUT ESOPHAGITIS: ICD-10-CM

## 2024-07-19 RX ORDER — OMEPRAZOLE 40 MG/1
CAPSULE, DELAYED RELEASE ORAL
Qty: 90 CAPSULE | Refills: 0 | Status: SHIPPED | OUTPATIENT
Start: 2024-07-19

## 2024-08-09 NOTE — PROGRESS NOTES
ICD-10-CM    1. Gastroesophageal reflux disease without esophagitis  K21.9 sucralfate (CARAFATE) 1 GM tablet     Adult GI  Referral - Procedure Only       PLAN: Recheck 3 weeks      Subjective   Stacia is a 63 year old, presenting for the following health issues:  GI Problem (Reflux, possible referral )        8/14/2024     1:34 PM   Additional Questions   Roomed by Sudeep LEBLANC LPN   Accompanied by Self         8/14/2024     1:34 PM   Patient Reported Additional Medications   Patient reports taking the following new medications None     History of Present Illness       Reason for visit:  Heartburn    He eats 2-3 servings of fruits and vegetables daily.He consumes 0 sweetened beverage(s) daily.He exercises with enough effort to increase his heart rate 10 to 19 minutes per day.  He exercises with enough effort to increase his heart rate 3 or less days per week.   He is taking medications regularly.         Pain History:  When did you first notice your pain? About 2 months ago   Have you seen this provider for your pain in the past? No   Where in your body do your have pain? Abdmonen/epigastric  Are you seeing anyone else for your pain? No  What makes your pain better? Burping, farting, bland foods   What makes your pain worse? Spicy, hot, firm foods  How has pain affected your ability to work? Pain does not limit ability to work           8/14/2024     2:26 PM   PHQ-9 SCORE   PHQ-9 Total Score MyChart 6 (Mild depression)   PHQ-9 Total Score 6     Chronic Pain - Initial Assessment:    How would you describe your pain? Cramping/squeezing   Have you had any recent changes to the severity or character of your pain? Was worse but improved in last 2 weeks .   Is there an underlying cause that has been identified? Firm foods     Which of these pain treatments have you tried? None   Previous medication treatments:  Other - antiacids   SUBJECTIVE:  63 year old.The patient has a complaint of stomach pain.  This started 2  weeks ago. Location upper epigastric area  quality pressure Associated symptoms are burping and passing gas/.  Brought on by swallowing food or liquid pain is constant but made worse with eating or swallowing. .  Better I'm the am . ROS some nausea but no throw up.  Sometimes diarrhea/ no melena or bloody stools  over the last week is slowly improving. Distant history of h pylori  Reviewed health maintenance  Patient Active Problem List   Diagnosis    CARDIOVASCULAR SCREENING; LDL GOAL LESS THAN 160    Dry eye syndrome    TB (tuberculosis), treated    Diagnostic skin and sensitization tests    Seasonal allergic rhinitis    Seasonal allergic conjunctivitis    Allergic rhinitis due to animal dander    Allergy to mold spores    Rhinitis, allergic to other allergen    Esophageal reflux    Cough    Hemoptysis    Pulmonary tuberculosis    Gastroesophageal reflux disease without esophagitis    Snoring    Gastroesophageal reflux disease with esophagitis    High cholesterol    Aneurysm of ascending aorta without rupture (H24)    Essential hypertension with goal blood pressure less than 130/80    Prediabetes     Past Medical History:   Diagnosis Date    Allergic rhinitis due to animal dander     Allergy to mold spores     6/11/12 skin tests pos. for:  cat/dog/CR/DM/M/T/G/W    Bell's palsy     Diagnostic skin and sensitization tests 6/11/12 skin tests pos. for:  cat/dog/CR/DM/M/T/G/W    Essential hypertension with goal blood pressure less than 130/80 5/10/2023    Need for desensitization to allergens IT 6/12 to 8/13 only-stopped by pt, no hx of rxn--IT for cat/dog/DM/M/T/G/W     Rhinitis, allergic to other allergen     Seasonal allergic conjunctivitis     Seasonal allergic rhinitis        OBJECTIVE:  no apparent distress  /60   Pulse 78   Temp 98.2  F (36.8  C) (Tympanic)   Resp 15   Wt 68.5 kg (151 lb)   SpO2 100%   BMI 22.30 kg/m      LUNGS:  CTA B/L, no wheezing or crackles.   Cardiovascular: negative, PMI  normal. No lifts, heaves, or thrills. RRR. No murmurs, clicks gallops or rub   Gastrointestinal: Abdomen soft, non-tender. BS normal. No masses, organomegaly       Signed Electronically by: Juan Jose Hargrove MD

## 2024-08-14 ENCOUNTER — OFFICE VISIT (OUTPATIENT)
Dept: FAMILY MEDICINE | Facility: CLINIC | Age: 64
End: 2024-08-14
Payer: COMMERCIAL

## 2024-08-14 VITALS
WEIGHT: 151 LBS | SYSTOLIC BLOOD PRESSURE: 110 MMHG | BODY MASS INDEX: 22.3 KG/M2 | OXYGEN SATURATION: 100 % | HEART RATE: 78 BPM | TEMPERATURE: 98.2 F | DIASTOLIC BLOOD PRESSURE: 60 MMHG | RESPIRATION RATE: 15 BRPM

## 2024-08-14 DIAGNOSIS — K21.9 GASTROESOPHAGEAL REFLUX DISEASE WITHOUT ESOPHAGITIS: Primary | ICD-10-CM

## 2024-08-14 DIAGNOSIS — I10 PRIMARY HYPERTENSION: ICD-10-CM

## 2024-08-14 PROCEDURE — 99214 OFFICE O/P EST MOD 30 MIN: CPT | Performed by: FAMILY MEDICINE

## 2024-08-14 RX ORDER — SUCRALFATE 1 G/1
2 TABLET ORAL 2 TIMES DAILY
Qty: 120 TABLET | Refills: 1 | Status: SHIPPED | OUTPATIENT
Start: 2024-08-14 | End: 2024-09-04

## 2024-08-14 ASSESSMENT — PAIN SCALES - GENERAL: PAINLEVEL: MILD PAIN (2)

## 2024-08-14 ASSESSMENT — PATIENT HEALTH QUESTIONNAIRE - PHQ9
SUM OF ALL RESPONSES TO PHQ QUESTIONS 1-9: 6
SUM OF ALL RESPONSES TO PHQ QUESTIONS 1-9: 6
10. IF YOU CHECKED OFF ANY PROBLEMS, HOW DIFFICULT HAVE THESE PROBLEMS MADE IT FOR YOU TO DO YOUR WORK, TAKE CARE OF THINGS AT HOME, OR GET ALONG WITH OTHER PEOPLE: NOT DIFFICULT AT ALL

## 2024-08-16 ENCOUNTER — TELEPHONE (OUTPATIENT)
Dept: GASTROENTEROLOGY | Facility: CLINIC | Age: 64
End: 2024-08-16
Payer: COMMERCIAL

## 2024-08-16 NOTE — TELEPHONE ENCOUNTER
Attempted to contact patient in order to complete pre assessment questions.     No answer. Left message to return call to [phone] option 4    Callback communication sent via [employer].    --------------------------------------------------------------------------------------------------------------------        Pre visit planning completed.      Procedure details:    Patient scheduled for Upper endoscopy (EGD) on 8/20/24.     Arrival time: 0840. Procedure time 0925    Facility location: [facility]; [address] [place] [address] [address], [place] [address]. Check in location: 2nd Floor at Surgery desk.    Sedation type: Conscious sedation     Pre op exam needed? No.    Indication for procedure:   Gastroesophageal reflux disease without esophagitis             Chart review:     Electronic implanted devices? No    Recent diagnosis of diverticulitis within the last 6 weeks? No      Medication review:    Diabetic? Yes. Diabetic medication HOLDING recommendations: Oral diabetic medications: Metformin (glucophage): HOLD day of procedure.     Anticoagulants? No    Weight loss medication/injectable? No.    NSAIDS? No NSAID medications per patient's medication list.  RN will verify with pre-assessment call.    Other medication HOLDING recommendations:  EGD: Sucralfate (Carafate): HOLD 1 day before procedure.      Prep for procedure:       Prep instructions sent via [employer]         Corinne Kliber, RN  Endoscopy Procedure Pre Assessment RN  [phone] option 4

## 2024-08-16 NOTE — TELEPHONE ENCOUNTER
"Endoscopy Scheduling Screen    Have you had a positive Covid test in the last 14 days?  No    What is your communication preference for Instructions and/or Bowel Prep?   MyChart    What insurance is in the chart?  Other:  [employer]    Ordering/Referring Provider: Juan Jose Hargrove MD   (If ordering provider performs procedure, schedule with ordering provider unless otherwise instructed. )    BMI: Estimated body mass index is 22.3 kg/m  as calculated from the following:    Height as of 10/25/23: 1.753 m (5' 9\").    Weight as of 8/14/24: 68.5 kg (151 lb).     Sedation Ordered  moderate sedation.   If patient BMI > 50 do not schedule in [facility].    If patient BMI > 45 do not schedule at [facility].    Are you taking methadone or Suboxone?  No    Have you had difficulties, pain, or discomfort during past endoscopy procedures?  No    Are you taking any prescription medications for pain 3 or more times per week?   NO, No RN review required.    Do you have a history of malignant hyperthermia?  No    (Females) Are you currently pregnant?   No     Have you been diagnosed or told you have pulmonary hypertension?   No    Do you have an LVAD?  No    Have you been told you have moderate to severe sleep apnea?  No    Have you been told you have COPD, asthma, or any other lung disease?  Yes     What breathing problems do you have?  TB, which was treated per chart and pt      Do you use home oxygen?  No    Have your breathing problems required an ED visit or hospitalization in the last year?  No    Do you have any heart conditions?  Yes     In the past year, have you had any hospitalizations for heart related issues including cardiomyopathy, heart attack, or stent placement?  No    Do you have any implantable devices in your body (pacemaker, ICD)?  No    Do you take nitroglycerine?  No    Have you ever had or are you waiting for an organ transplant?  No. Continue scheduling, no site restrictions.    Have you had a stroke or transient " "ischemic attack (TIA aka \"mini stroke\" in the last 6 months?   No    Have you been diagnosed with or been told you have cirrhosis of the liver?   No    Are you currently on dialysis?   No    Do you need assistance transferring?   No    BMI: Estimated body mass index is 22.3 kg/m  as calculated from the following:    Height as of 10/25/23: 1.753 m (5' 9\").    Weight as of 8/14/24: 68.5 kg (151 lb).     Is patients BMI > 40 and scheduling location UP?  No    Do you take an injectable medication for weight loss or diabetes (excluding insulin)?  No    Do you take the medication Naltrexone?  No    Do you take blood thinners?  No       Prep   Are you currently on dialysis or do you have chronic kidney disease?  No    Do you have a diagnosis of diabetes?  No    Do you have a diagnosis of cystic fibrosis (CF)?  No    On a regular basis do you go 3 -5 days between bowel movements?  Yes (Extended Prep)    BMI > 40?  No    Preferred Pharmacy:    [facility] #[id number] [place] [id number] [facility] AT CORNER OF [address] [address]  Phone: [phone] Fax: [phone]      Final Scheduling Details     Procedure scheduled  Upper endoscopy (EGD)    Surgeon:  Katie     Date of procedure:  08/20/2024     Pre-OP / PAC:   No - Not required for this site.    Location  [facility] - [facility] - Patient preference.    Sedation   Moderate Sedation - Per order.      Patient Reminders:   You will receive a call from a Nurse to review instructions and health history.  This assessment must be completed prior to your procedure.  Failure to complete the Nurse assessment may result in the procedure being cancelled.      On the day of your procedure, please designate an adult(s) who can drive you home stay with you for the next 24 hours. The medicines used in the exam will make you sleepy. You will not be able to drive.      You cannot take public transportation, ride share services, or non-medical taxi " service without a responsible caregiver.  Medical transport services are allowed with the requirement that a responsible caregiver will receive you at your destination.  We require that drivers and caregivers are confirmed prior to your procedure.

## 2024-08-16 NOTE — TELEPHONE ENCOUNTER
Pre assessment completed for upcoming procedure.   (Please see previous telephone encounter notes for complete details)    Patient  returned call.       Procedure details:    Arrival time and facility location reviewed.    Pre op exam needed? No.    Designated  policy reviewed. Instructed to have someone stay 6  hours post procedure.       Medication review:    Medications reviewed. Please see supporting documentation below. Holding recommendations discussed (if applicable).   Oral diabetic medication(s): Metformin (glucophage): HOLD day of procedure.  Sucralfate (Carafate): HOLD 1 day before procedure.      Prep for procedure:     Procedure prep instructions reviewed.Pt noted no known gastroparesis, achalasia, or delayed gastric emptying, pt will follow standard NPO guidelines.         Any additional information needed:  N/A      Patient  verbalized understanding and had no questions or concerns at this time.      Elsy Elena RN  Endoscopy Procedure Pre Assessment   [phone] option 4

## 2024-08-16 NOTE — TELEPHONE ENCOUNTER
"LISINOPRIL 5 MG TABLET       Last Written Prescription Date:  5/16/24  Last Fill Quantity: 90,   # refills: 0  Last Office Visit : 5/31/23  Future Office visit:  None    Routing refill request to PCP team( Delvin)for review/approval because:  PRN Cardiology return  At last Cardiology visit 5/31/24: recommendation\" He should follow up with his pcp as planned for intensification of med regimen for ongoing BP control.     -Continue carvedilol 12.5 mg twice daily  -Restart lisinopril 5 mg daily\"  "

## 2024-08-19 RX ORDER — LISINOPRIL 5 MG/1
5 TABLET ORAL DAILY
Qty: 90 TABLET | Refills: 0 | Status: SHIPPED | OUTPATIENT
Start: 2024-08-19

## 2024-08-20 ENCOUNTER — HOSPITAL ENCOUNTER (OUTPATIENT)
Facility: AMBULATORY SURGERY CENTER | Age: 64
Discharge: HOME | End: 2024-08-20
Attending: INTERNAL MEDICINE | Admitting: INTERNAL MEDICINE
Payer: COMMERCIAL

## 2024-08-20 VITALS
HEART RATE: 58 BPM | RESPIRATION RATE: 16 BRPM | TEMPERATURE: 97.8 F | OXYGEN SATURATION: 99 % | SYSTOLIC BLOOD PRESSURE: 130 MMHG | DIASTOLIC BLOOD PRESSURE: 89 MMHG

## 2024-08-20 LAB — UPPER GI ENDOSCOPY: NORMAL

## 2024-08-20 PROCEDURE — G8907 PT DOC NO EVENTS ON DISCHARG: HCPCS

## 2024-08-20 PROCEDURE — 88305 TISSUE EXAM BY PATHOLOGIST: CPT | Performed by: STUDENT IN AN ORGANIZED HEALTH CARE EDUCATION/TRAINING PROGRAM

## 2024-08-20 PROCEDURE — 43239 EGD BIOPSY SINGLE/MULTIPLE: CPT

## 2024-08-20 PROCEDURE — G8918 PT W/O PREOP ORDER IV AB PRO: HCPCS

## 2024-08-20 RX ORDER — FENTANYL CITRATE 50 UG/ML
INJECTION, SOLUTION INTRAMUSCULAR; INTRAVENOUS PRN
Status: DISCONTINUED | OUTPATIENT
Start: 2024-08-20 | End: 2024-08-20 | Stop reason: HOSPADM

## 2024-08-20 NOTE — H&P
[facility] Anesthesia Pre-op History and Physical    Stacia Abernathy MRN# [id number]   Age: 63 year old YOB: 1960            Date of Exam 8/20/2024         Primary care provider: Juan Jose Hargrove         Chief Complaint and/or Reason for Procedure:     Epigastric pain, reflux         Active problem list:     Patient Active Problem List    Diagnosis Date Noted    Aneurysm of ascending aorta without rupture (H24) 05/10/2023     Priority: Medium    Essential hypertension with goal blood pressure less than 130/80 05/10/2023     Priority: Medium    Prediabetes 05/10/2023     Priority: Medium    High cholesterol 11/03/2021     Priority: Medium    Gastroesophageal reflux disease with esophagitis 08/14/2018     Priority: Medium    Gastroesophageal reflux disease without esophagitis 09/20/2017     Priority: Medium    Snoring 09/20/2017     Priority: Medium    Esophageal reflux 08/19/2014     Priority: Medium    Diagnostic skin and sensitization tests      Priority: Medium    Seasonal allergic rhinitis      Priority: Medium    Seasonal allergic conjunctivitis      Priority: Medium    Allergic rhinitis due to animal dander      Priority: Medium    Allergy to mold spores      Priority: Medium     6/11/12 skin tests pos. for:  cat/dog/DM/M/T/G/W      Rhinitis, allergic to other allergen      Priority: Medium     IMO update changed this record. Please review for accuracy      TB (tuberculosis), treated 03/28/2012     Priority: Medium    Dry eye syndrome 08/24/2011     Priority: Medium    CARDIOVASCULAR SCREENING; LDL GOAL LESS THAN 160 10/31/2010     Priority: Medium    Pulmonary tuberculosis 10/13/2009     Priority: Medium    Cough 10/04/2009     Priority: Medium    Hemoptysis 10/04/2009     Priority: Medium     Overview:   Inactive ICD-9 code replaced with correct active ICD-9. Display name retained.  Formatting of this note might be different from the original. Inactive ICD-9 code replaced with correct  active ICD-9. Display name retained.              Medications (include herbals and vitamins):   Any Plavix use in the last 7 days? No     Current Outpatient Medications   Medication Sig Dispense Refill    carvedilol (COREG) 12.5 MG tablet TAKE 1 TABLET BY MOUTH TWICE A DAY WITH MEALS 180 tablet 1    lisinopril (ZESTRIL) 5 MG tablet TAKE 1 TABLET (5 MG) BY MOUTH DAILY GET FUTURE REFILLS FROM DR CROWLEY 90 tablet 0    metFORMIN (GLUCOPHAGE XR) 500 MG 24 hr tablet TAKE 1 TABLET BY MOUTH EVERY DAY WITH DINNER 90 tablet 1    omeprazole (PRILOSEC) 40 MG DR capsule TAKE 1 CAPSULE (40 MG) BY MOUTH DAILY TAKE 30-60 MINUTES BEFORE A MEAL. NEEDS TO BE SEEN 90 capsule 0    rosuvastatin (CRESTOR) 10 MG tablet TAKE 1 TABLET (10 MG) BY MOUTH DAILY. 90 tablet 3    sucralfate (CARAFATE) 1 GM tablet Take 2 tablets (2 g) by mouth 2 times daily 120 tablet 1    augmented betamethasone dipropionate (DIPROLENE) 0.05 % external lotion APPLY TO AFFECTED AREA TWICE A DAY 60 mL 1     Current Facility-Administered Medications   Medication Dose Route Frequency Provider Last Rate Last Admin    lidocaine 1 % injection 2 mL  2 mL   Oz Boone, DO   2 mL at 02/08/22 1401    triamcinolone (KENALOG-40) injection 40 mg  40 mg   Oz Boone, DO   40 mg at 02/08/22 1401             Allergies:    No Known Allergies  Allergy to Latex? No  Allergy to tape?   No  Intolerances:             Physical Exam:   All vitals have been reviewed  Patient Vitals for the past 8 hrs:   BP Temp Temp src Pulse Resp SpO2   08/20/24 0906 (!) 159/89 97.8  F (36.6  C) Temporal 60 16 99 %     No intake/output data recorded.  Lungs:   No increased work of breathing, good air exchange, clear to auscultation bilaterally, no crackles or wheezing     Cardiovascular:   normal S1 and S2             Lab / Radiology Results:            Anesthetic risk and/or ASA classification:   2    Brielle Wilson DO

## 2024-08-22 LAB
PATH REPORT.COMMENTS IMP SPEC: NORMAL
PATH REPORT.COMMENTS IMP SPEC: NORMAL
PATH REPORT.FINAL DX SPEC: NORMAL
PATH REPORT.GROSS SPEC: NORMAL
PATH REPORT.MICROSCOPIC SPEC OTHER STN: NORMAL
PATH REPORT.RELEVANT HX SPEC: NORMAL
PHOTO IMAGE: NORMAL

## 2024-09-03 NOTE — PATIENT INSTRUCTIONS
Patient Education   Preventive Care Advice   This is general advice given by our system to help you stay healthy. However, your care team may have specific advice just for you. Please talk to your care team about your preventive care needs.  Nutrition  Eat 5 or more servings of fruits and vegetables each day.  Try wheat bread, brown rice and whole grain pasta (instead of white bread, rice, and pasta).  Get enough calcium and vitamin D. Check the label on foods and aim for 100% of the RDA (recommended daily allowance).  Lifestyle  Exercise at least 150 minutes each week  (30 minutes a day, 5 days a week).  Do muscle strengthening activities 2 days a week. These help control your weight and prevent disease.  No smoking.  Wear sunscreen to prevent skin cancer.  Have a dental exam and cleaning every 6 months.  Yearly exams  See your health care team every year to talk about:  Any changes in your health.  Any medicines your care team has prescribed.  Preventive care, family planning, and ways to prevent chronic diseases.  Shots (vaccines)   HPV shots (up to age 26), if you've never had them before.  Hepatitis B shots (up to age 59), if you've never had them before.  COVID-19 shot: Get this shot when it's due.  Flu shot: Get a flu shot every year.  Tetanus shot: Get a tetanus shot every 10 years.  Pneumococcal, hepatitis A, and RSV shots: Ask your care team if you need these based on your risk.  Shingles shot (for age 50 and up)  General health tests  Diabetes screening:  Starting at age 35, Get screened for diabetes at least every 3 years.  If you are younger than age 35, ask your care team if you should be screened for diabetes.  Cholesterol test: At age 39, start having a cholesterol test every 5 years, or more often if advised.  Bone density scan (DEXA): At age 50, ask your care team if you should have this scan for osteoporosis (brittle bones).  Hepatitis C: Get tested at least once in your life.  STIs (sexually  transmitted infections)  Before age 24: Ask your care team if you should be screened for STIs.  After age 24: Get screened for STIs if you're at risk. You are at risk for STIs (including HIV) if:  You are sexually active with more than one person.  You don't use condoms every time.  You or a partner was diagnosed with a sexually transmitted infection.  If you are at risk for HIV, ask about PrEP medicine to prevent HIV.  Get tested for HIV at least once in your life, whether you are at risk for HIV or not.  Cancer screening tests  Cervical cancer screening: If you have a cervix, begin getting regular cervical cancer screening tests starting at age 21.  Breast cancer scan (mammogram): If you've ever had breasts, begin having regular mammograms starting at age 40. This is a scan to check for breast cancer.  Colon cancer screening: It is important to start screening for colon cancer at age 45.  Have a colonoscopy test every 10 years (or more often if you're at risk) Or, ask your provider about stool tests like a FIT test every year or Cologuard test every 3 years.  To learn more about your testing options, visit:   .  For help making a decision, visit:   https://bit.ly/to56017.  Prostate cancer screening test: If you have a prostate, ask your care team if a prostate cancer screening test (PSA) at age 55 is right for you.  Lung cancer screening: If you are a current or former smoker ages 50 to 80, ask your care team if ongoing lung cancer screenings are right for you.  For informational purposes only. Not to replace the advice of your health care provider. Copyright   2023 [employer]. All rights reserved. Clinically reviewed by the [facility]. [employer] [id number] - REV 01/24.

## 2024-09-04 ENCOUNTER — OFFICE VISIT (OUTPATIENT)
Dept: FAMILY MEDICINE | Facility: CLINIC | Age: 64
End: 2024-09-04
Payer: COMMERCIAL

## 2024-09-04 VITALS
HEIGHT: 69 IN | TEMPERATURE: 98.1 F | BODY MASS INDEX: 22.45 KG/M2 | DIASTOLIC BLOOD PRESSURE: 84 MMHG | WEIGHT: 151.6 LBS | SYSTOLIC BLOOD PRESSURE: 124 MMHG | RESPIRATION RATE: 19 BRPM | HEART RATE: 70 BPM | OXYGEN SATURATION: 100 %

## 2024-09-04 DIAGNOSIS — Z00.00 ROUTINE GENERAL MEDICAL EXAMINATION AT A HEALTH CARE FACILITY: Primary | ICD-10-CM

## 2024-09-04 PROCEDURE — 99396 PREV VISIT EST AGE 40-64: CPT | Performed by: FAMILY MEDICINE

## 2024-09-04 ASSESSMENT — PAIN SCALES - GENERAL: PAINLEVEL: NO PAIN (0)

## 2024-09-04 NOTE — PROGRESS NOTES
Preventive Care Visit  [facility]  Juan Jose Hargrove MD, Family Medicine  Sep 4, 2024        ICD-10-CM    1. Routine general medical examination at a health care facility  Z00.00        PLAN:continue present medication Follow up in 1 year     Subjective   Stacia is a 63 year old, presenting for the following:  Physical        9/4/2024     9:03 AM   Additional Questions   Roomed by Sudeep LEBLANC LPN   Accompanied by Self         9/4/2024     9:03 AM   Patient Reported Additional Medications   Patient reports taking the following new medications None        Health Care Directive  Patient does not have a Health Care Directive or Living Will: Discussed advance care planning with patient; however, patient declined at this time.    HPI         9/1/2024   General Health   How would you rate your overall physical health? Good   Feel stress (tense, anxious, or unable to sleep) To some extent      (!) STRESS CONCERN      9/1/2024   Nutrition   Three or more servings of calcium each day? Yes   Diet: Regular (no restrictions)   How many servings of fruit and vegetables per day? (!) 2-3   How many sweetened beverages each day? 0-1            9/1/2024   Exercise   Days per week of moderate/strenous exercise 4 days   Average minutes spent exercising at this level 40 min            9/1/2024   Social Factors   Frequency of gathering with friends or relatives Patient declined   Worry food won't last until get money to buy more No   Food not last or not have enough money for food? No   Do you have housing? (Housing is defined as stable permanent housing and does not include staying ouside in a car, in a tent, in an abandoned building, in an overnight shelter, or couch-surfing.) Yes   Are you worried about losing your housing? No   Lack of transportation? No   Unable to get utilities (heat,electricity)? No            9/1/2024   Fall Risk   Fallen 2 or more times in the past year? No   Trouble with walking or balance?  "No             9/1/2024   Dental   Dentist two times every year? Yes               Today's PHQ-2 Score:       9/4/2024     8:55 AM   PHQ-2 ( 1999 [employer])   Q1: Little interest or pleasure in doing things 1   Q2: Feeling down, depressed or hopeless 1   PHQ-2 Score 2   Q1: Little interest or pleasure in doing things Several days   Q2: Feeling down, depressed or hopeless Several days   PHQ-2 Score 2           9/1/2024   Substance Use   Alcohol more than 3/day or more than 7/wk No   Do you use any other substances recreationally? No        Social History     Tobacco Use    Smoking status: Former     Current packs/day: 0.00     Types: Cigarettes     Quit date: 9/11/2008     Years since quitting: 15.9    Smokeless tobacco: Never   Vaping Use    Vaping status: Never Used   Substance Use Topics    Alcohol use: Yes     Comment: OCC    Drug use: No           9/1/2024   STI Screening   New sexual partner(s) since last STI/HIV test? No      Last PSA: No results found for: \"PSA\"  ASCVD Risk   The 10-year ASCVD risk score (Kamron ARREOLA, et al., 2019) is: 9.3%    Values used to calculate the score:      Age: 63 years      Sex: Male      Is Non- : No      Diabetic: No      Tobacco smoker: No      Systolic Blood Pressure: 124 mmHg      Is BP treated: Yes      HDL Cholesterol: 48 mg/dL      Total Cholesterol: 139 mg/dL           Reviewed and updated as needed this visit by Provider                    Past Medical History:   Diagnosis Date    Allergic rhinitis due to animal dander     Allergy to mold spores     6/11/12 skin tests pos. for:  cat/dog/CR/DM/M/T/G/W    Bell's palsy     Diagnostic skin and sensitization tests 6/11/12 skin tests pos. for:  cat/dog/CR/DM/M/T/G/W    Essential hypertension with goal blood pressure less than 130/80 5/10/2023    Need for desensitization to allergens IT 6/12 to 8/13 only-stopped by pt, no hx of rxn--IT for cat/dog/DM/M/T/G/W     Rhinitis, allergic to other allergen  " "   Seasonal allergic conjunctivitis     Seasonal allergic rhinitis          Review of Systems  CONSTITUTIONAL: NEGATIVE for fever, chills, change in weight  INTEGUMENTARY/SKIN: NEGATIVE for worrisome rashes, moles or lesions  EYES: NEGATIVE for vision changes or irritation  ENT/MOUTH: NEGATIVE for ear, mouth and throat problems  RESP: NEGATIVE for significant cough or SOB  BREAST: NEGATIVE for masses, tenderness or discharge  CV: NEGATIVE for chest pain, palpitations or peripheral edema  GI: NEGATIVE for nausea, abdominal pain, heartburn, or change in bowel habits  : NEGATIVE for frequency, dysuria, or hematuria  MUSCULOSKELETAL: NEGATIVE for significant arthralgias or myalgia  NEURO: NEGATIVE for weakness, dizziness or paresthesias  ENDOCRINE: NEGATIVE for temperature intolerance, skin/hair changes  HEME: NEGATIVE for bleeding problems  PSYCHIATRIC: NEGATIVE for changes in mood or affect     Objective    Exam  /84   Pulse 70   Temp 98.1  F (36.7  C) (Tympanic)   Resp 19   Ht 1.753 m (5' 9\")   Wt 68.8 kg (151 lb 9.6 oz)   SpO2 100%   BMI 22.39 kg/m     Estimated body mass index is 22.39 kg/m  as calculated from the following:    Height as of this encounter: 1.753 m (5' 9\").    Weight as of this encounter: 68.8 kg (151 lb 9.6 oz).    Physical Exam  GENERAL: alert and no distress  EYES: Eyes grossly normal to inspection, PERRL and conjunctivae and sclerae normal  HENT: ear canals and TM's normal, nose and mouth without ulcers or lesions  NECK: no adenopathy, no asymmetry, masses, or scars  RESP: lungs clear to auscultation - no rales, rhonchi or wheezes  CV: regular rate and rhythm, normal S1 S2, no S3 or S4, no murmur, click or rub, no peripheral edema  ABDOMEN: soft, nontender, no hepatosplenomegaly, no masses and bowel sounds normal  MS: no gross musculoskeletal defects noted, no edema  SKIN: no suspicious lesions or rashes  NEURO: Normal strength and tone, mentation intact and speech normal  PSYCH: " mentation appears normal, affect normal/bright        Signed Electronically by: Juan Jose Hargrove MD

## 2024-09-04 NOTE — PROGRESS NOTES
Preventive Care Visit  [facility]  Juan Jose Hargrove MD, Family Medicine  Sep 4, 2024  {Provider  Link to [facility] :[id number]}        Lucia Palomo is a 63 year old, presenting for the following:  Physical        9/4/2024     9:03 AM   Additional Questions   Roomed by Sudeep LEBLANC LPN   Accompanied by Self         9/4/2024     9:03 AM   Patient Reported Additional Medications   Patient reports taking the following new medications None        Health Care Directive  Patient does not have a Health Care Directive or Living Will: Discussed advance care planning with patient; however, patient declined at this time.    General Health   How would you rate your overall physical health? Good   Feel stress (tense, anxious, or unable to sleep) To some extent      (!) STRESS CONCERN      9/1/2024   Nutrition   Three or more servings of calcium each day? Yes   Diet: Regular (no restrictions)   How many servings of fruit and vegetables per day? (!) 2-3   How many sweetened beverages each day? 0-1            9/1/2024   Exercise   Days per week of moderate/strenous exercise 4 days   Average minutes spent exercising at this level 40 min            9/1/2024   Social Factors   Frequency of gathering with friends or relatives Patient declined   Worry food won't last until get money to buy more No   Food not last or not have enough money for food? No   Do you have housing? (Housing is defined as stable permanent housing and does not include staying ouside in a car, in a tent, in an abandoned building, in an overnight shelter, or couch-surfing.) Yes   Are you worried about losing your housing? No   Lack of transportation? No   Unable to get utilities (heat,electricity)? No            9/1/2024   Fall Risk   Fallen 2 or more times in the past year? No   Trouble with walking or balance? No             9/1/2024   Dental   Dentist two times every year? Yes               Today's PHQ-2 Score:       9/4/2024     8:55 AM  "  PHQ-2 ( 1999 Pfizer)   Q1: Little interest or pleasure in doing things 1   Q2: Feeling down, depressed or hopeless 1   PHQ-2 Score 2   Q1: Little interest or pleasure in doing things Several days   Q2: Feeling down, depressed or hopeless Several days   PHQ-2 Score 2           9/1/2024   Substance Use   Alcohol more than 3/day or more than 7/wk No   Do you use any other substances recreationally? No        Social History     Tobacco Use    Smoking status: Former     Current packs/day: 0.00     Types: Cigarettes     Quit date: 9/11/2008     Years since quitting: 15.9    Smokeless tobacco: Never   Vaping Use    Vaping status: Never Used   Substance Use Topics    Alcohol use: Yes     Comment: OCC    Drug use: No     {Provider  If there are gaps in the social history shown above, please follow the link to update and then refresh the note Link to Social and Substance History :[id number]}      9/1/2024   STI Screening   New sexual partner(s) since last STI/HIV test? No      Last PSA: No results found for: \"PSA\"  ASCVD Risk   The 10-year ASCVD risk score (Kamron ARREOLA, et al., 2019) is: 9.3%    Values used to calculate the score:      Age: 63 years      Sex: Male      Is Non- : No      Diabetic: No      Tobacco smoker: No      Systolic Blood Pressure: 124 mmHg      Is BP treated: Yes      HDL Cholesterol: 48 mg/dL      Total Cholesterol: 139 mg/dL  "

## 2024-09-06 DIAGNOSIS — R21 RASH: ICD-10-CM

## 2024-09-06 RX ORDER — BETAMETHASONE DIPROPIONATE 0.5 MG/ML
LOTION, AUGMENTED TOPICAL 2 TIMES DAILY
Qty: 60 ML | Refills: 1 | Status: SHIPPED | OUTPATIENT
Start: 2024-09-06

## 2024-11-04 DIAGNOSIS — I71.21 ANEURYSM OF ASCENDING AORTA WITHOUT RUPTURE (H): ICD-10-CM

## 2024-11-04 DIAGNOSIS — I10 ESSENTIAL HYPERTENSION WITH GOAL BLOOD PRESSURE LESS THAN 130/80: ICD-10-CM

## 2024-11-04 RX ORDER — CARVEDILOL 12.5 MG/1
12.5 TABLET ORAL 2 TIMES DAILY WITH MEALS
Qty: 180 TABLET | Refills: 1 | Status: SHIPPED | OUTPATIENT
Start: 2024-11-04

## 2024-11-06 DIAGNOSIS — R73.03 PREDIABETES: ICD-10-CM

## 2024-11-06 RX ORDER — METFORMIN HYDROCHLORIDE 500 MG/1
500 TABLET, EXTENDED RELEASE ORAL
Qty: 90 TABLET | Refills: 0 | Status: SHIPPED | OUTPATIENT
Start: 2024-11-06

## 2024-11-16 DIAGNOSIS — I10 PRIMARY HYPERTENSION: ICD-10-CM

## 2024-11-17 RX ORDER — LISINOPRIL 5 MG/1
5 TABLET ORAL DAILY
Qty: 90 TABLET | Refills: 0 | Status: SHIPPED | OUTPATIENT
Start: 2024-11-17

## 2024-11-18 NOTE — TELEPHONE ENCOUNTER
Patient was in on 9/4/24 for a physical. Does he just need labs? Or, needs to see you as well?Valentina Ayala [facility]

## 2024-11-18 NOTE — TELEPHONE ENCOUNTER
Called and spoke to patient, he will call back to set up a fasting lab appointment.Valentina Ayala [facility]

## 2024-11-18 NOTE — TELEPHONE ENCOUNTER
My mistake.  I sent him home from the physical without having him draw labs.  Please, set up a fasting blood draw for him.

## 2024-11-27 ENCOUNTER — LAB (OUTPATIENT)
Dept: LAB | Facility: CLINIC | Age: 64
End: 2024-11-27
Payer: COMMERCIAL

## 2024-11-27 DIAGNOSIS — I10 PRIMARY HYPERTENSION: ICD-10-CM

## 2024-11-27 LAB
ALBUMIN SERPL BCG-MCNC: 4.5 G/DL (ref 3.5–5.2)
ALP SERPL-CCNC: 93 U/L (ref 40–150)
ALT SERPL W P-5'-P-CCNC: 34 U/L (ref 0–70)
ANION GAP SERPL CALCULATED.3IONS-SCNC: 11 MMOL/L (ref 7–15)
AST SERPL W P-5'-P-CCNC: 31 U/L (ref 0–45)
BILIRUB SERPL-MCNC: 0.6 MG/DL
BUN SERPL-MCNC: 11 MG/DL (ref 8–23)
CALCIUM SERPL-MCNC: 9.8 MG/DL (ref 8.8–10.4)
CHLORIDE SERPL-SCNC: 102 MMOL/L (ref 98–107)
CHOLEST SERPL-MCNC: 142 MG/DL
CREAT SERPL-MCNC: 0.9 MG/DL (ref 0.67–1.17)
EGFRCR SERPLBLD CKD-EPI 2021: >90 ML/MIN/1.73M2
FASTING STATUS PATIENT QL REPORTED: YES
FASTING STATUS PATIENT QL REPORTED: YES
GLUCOSE SERPL-MCNC: 109 MG/DL (ref 70–99)
HCO3 SERPL-SCNC: 26 MMOL/L (ref 22–29)
HDLC SERPL-MCNC: 50 MG/DL
LDLC SERPL CALC-MCNC: 70 MG/DL
NONHDLC SERPL-MCNC: 92 MG/DL
POTASSIUM SERPL-SCNC: 4.5 MMOL/L (ref 3.4–5.3)
PROT SERPL-MCNC: 7.2 G/DL (ref 6.4–8.3)
SODIUM SERPL-SCNC: 139 MMOL/L (ref 135–145)
TRIGL SERPL-MCNC: 112 MG/DL

## 2024-11-27 PROCEDURE — 36415 COLL VENOUS BLD VENIPUNCTURE: CPT

## 2024-11-27 PROCEDURE — 80061 LIPID PANEL: CPT

## 2024-11-27 PROCEDURE — 80053 COMPREHEN METABOLIC PANEL: CPT

## 2025-01-04 DIAGNOSIS — K21.9 GASTROESOPHAGEAL REFLUX DISEASE WITHOUT ESOPHAGITIS: ICD-10-CM

## 2025-01-05 RX ORDER — OMEPRAZOLE 40 MG/1
CAPSULE, DELAYED RELEASE ORAL
Qty: 90 CAPSULE | Refills: 0 | Status: SHIPPED | OUTPATIENT
Start: 2025-01-05

## 2025-02-01 DIAGNOSIS — E78.00 HIGH CHOLESTEROL: ICD-10-CM

## 2025-02-02 DIAGNOSIS — R21 RASH: ICD-10-CM

## 2025-02-02 DIAGNOSIS — R73.03 PREDIABETES: ICD-10-CM

## 2025-02-03 RX ORDER — ROSUVASTATIN CALCIUM 10 MG/1
10 TABLET, COATED ORAL DAILY
Qty: 90 TABLET | Refills: 0 | Status: SHIPPED | OUTPATIENT
Start: 2025-02-03

## 2025-02-03 RX ORDER — METFORMIN HYDROCHLORIDE 500 MG/1
500 TABLET, EXTENDED RELEASE ORAL
Qty: 90 TABLET | Refills: 0 | Status: SHIPPED | OUTPATIENT
Start: 2025-02-03

## 2025-02-03 RX ORDER — BETAMETHASONE DIPROPIONATE 0.5 MG/ML
LOTION, AUGMENTED TOPICAL 2 TIMES DAILY
Qty: 60 ML | Refills: 1 | Status: SHIPPED | OUTPATIENT
Start: 2025-02-03

## 2025-02-09 DIAGNOSIS — I10 PRIMARY HYPERTENSION: ICD-10-CM

## 2025-02-10 RX ORDER — LISINOPRIL 5 MG/1
TABLET ORAL
Qty: 90 TABLET | Refills: 2 | Status: SHIPPED | OUTPATIENT
Start: 2025-02-10

## 2025-04-01 DIAGNOSIS — R21 RASH: ICD-10-CM

## 2025-04-01 RX ORDER — BETAMETHASONE DIPROPIONATE 0.5 MG/ML
LOTION, AUGMENTED TOPICAL 2 TIMES DAILY
Qty: 60 ML | Refills: 1 | Status: SHIPPED | OUTPATIENT
Start: 2025-04-01

## 2025-04-03 ENCOUNTER — OFFICE VISIT (OUTPATIENT)
Dept: FAMILY MEDICINE | Facility: CLINIC | Age: 65
End: 2025-04-03
Payer: COMMERCIAL

## 2025-04-03 VITALS
BODY MASS INDEX: 22.51 KG/M2 | OXYGEN SATURATION: 98 % | SYSTOLIC BLOOD PRESSURE: 125 MMHG | DIASTOLIC BLOOD PRESSURE: 78 MMHG | RESPIRATION RATE: 12 BRPM | TEMPERATURE: 96.8 F | HEART RATE: 62 BPM | HEIGHT: 69 IN | WEIGHT: 152 LBS

## 2025-04-03 DIAGNOSIS — Z12.5 SCREENING PSA (PROSTATE SPECIFIC ANTIGEN): ICD-10-CM

## 2025-04-03 DIAGNOSIS — R35.0 URINE FREQUENCY: Primary | ICD-10-CM

## 2025-04-03 DIAGNOSIS — R73.03 PREDIABETES: ICD-10-CM

## 2025-04-03 DIAGNOSIS — I10 ESSENTIAL HYPERTENSION WITH GOAL BLOOD PRESSURE LESS THAN 130/80: ICD-10-CM

## 2025-04-03 LAB
ALBUMIN SERPL BCG-MCNC: 4.3 G/DL (ref 3.5–5.2)
ANION GAP SERPL CALCULATED.3IONS-SCNC: 9 MMOL/L (ref 7–15)
BUN SERPL-MCNC: 15.5 MG/DL (ref 8–23)
CALCIUM SERPL-MCNC: 9.3 MG/DL (ref 8.8–10.4)
CHLORIDE SERPL-SCNC: 104 MMOL/L (ref 98–107)
CREAT SERPL-MCNC: 0.91 MG/DL (ref 0.67–1.17)
EGFRCR SERPLBLD CKD-EPI 2021: >90 ML/MIN/1.73M2
EST. AVERAGE GLUCOSE BLD GHB EST-MCNC: 123 MG/DL
GLUCOSE SERPL-MCNC: 94 MG/DL (ref 70–99)
HBA1C MFR BLD: 5.9 % (ref 0–5.6)
HCO3 SERPL-SCNC: 25 MMOL/L (ref 22–29)
PHOSPHATE SERPL-MCNC: 3.2 MG/DL (ref 2.5–4.5)
POTASSIUM SERPL-SCNC: 4.2 MMOL/L (ref 3.4–5.3)
PSA SERPL DL<=0.01 NG/ML-MCNC: 1.22 NG/ML (ref 0–4.5)
SODIUM SERPL-SCNC: 138 MMOL/L (ref 135–145)

## 2025-04-03 RX ORDER — CARVEDILOL 12.5 MG/1
12.5 TABLET ORAL 2 TIMES DAILY WITH MEALS
Qty: 180 TABLET | Refills: 1 | Status: SHIPPED | OUTPATIENT
Start: 2025-04-03

## 2025-04-03 NOTE — PROGRESS NOTES
"  ASSESSMENT / PLAN:  (R35.0) Urine frequency  (primary encounter diagnosis)  Comment: improving. Possibly from prostatitis?  Plan: patient deferred rectal exam. Check psa.   Can try saw palmetto over the counter. Consider flomax or urology. Expected course and warning signs reviewed. Call/email with questions/concerns      (R73.03) Prediabetes  Plan: Hemoglobin A1c        Continue metformin/low carb diet.     (I10) Essential hypertension with goal blood pressure less than 130/80  Comment: stable  Plan: Renal panel, carvedilol (COREG) 12.5 MG tablet        Continue meds. Chest pain or shortness of breath to er. Recheck in 6 months      (Z12.5) Screening PSA (prostate specific antigen)  Plan: Prostate Specific Antigen Screen        Discussed pros/cons of testing - patient would like yearly testing.       Lucia Palomo is a 64 year old, presenting for the following health issues:  RECHECK (Prostrate)      One month ago some frequency issues.   Nocturia 1-2 days. Flow overall ok. Water intake 1-2 bottles water.   No caffeine. No hematuria. No std concerns.  No family history prostate.   No constipation issues.   History hyperglycemia, high cholesterol, gerd and htn.       4/3/2025    11:38 AM   Additional Questions   Roomed by Sandra   Accompanied by self         4/3/2025    11:38 AM   Patient Reported Additional Medications   Patient reports taking the following new medications n/a     History of Present Illness       Reason for visit:  Prostate   He is taking medications regularly.                      Objective    /78   Pulse 62   Temp 96.8  F (36  C) (Tympanic)   Resp 12   Ht 1.753 m (5' 9\")   Wt 68.9 kg (152 lb)   SpO2 98%   BMI 22.45 kg/m    Body mass index is 22.45 kg/m .  Physical Exam   GENERAL: alert and no distress  EYES: Eyes grossly normal to inspection, PERRL and conjunctivae and sclerae normal  NECK: no adenopathy, no asymmetry, masses, or scars  RESP: lungs clear to auscultation - no " rales, rhonchi or wheezes  CV: regular rate and rhythm, normal S1 S2, no S3 or S4, no murmur, click or rub, no peripheral edema   ABDOMEN: soft, nontender, no hepatosplenomegaly, no masses and bowel sounds normal  RECTAL (male): patient deferred exam  MS: no gross musculoskeletal defects noted, no edema  NEURO: Normal strength and tone, mentation intact and speech normal  PSYCH: mentation appears normal, affect normal/bright            Signed Electronically by: Ifeanyi Cameron MD

## 2025-04-30 DIAGNOSIS — R73.03 PREDIABETES: ICD-10-CM

## 2025-04-30 DIAGNOSIS — E78.00 HIGH CHOLESTEROL: ICD-10-CM

## 2025-04-30 RX ORDER — ROSUVASTATIN CALCIUM 10 MG/1
10 TABLET, COATED ORAL DAILY
Qty: 90 TABLET | Refills: 0 | Status: SHIPPED | OUTPATIENT
Start: 2025-04-30

## 2025-04-30 RX ORDER — METFORMIN HYDROCHLORIDE 500 MG/1
500 TABLET, EXTENDED RELEASE ORAL
Qty: 90 TABLET | Refills: 0 | Status: SHIPPED | OUTPATIENT
Start: 2025-04-30

## 2025-05-07 ENCOUNTER — TRANSFERRED RECORDS (OUTPATIENT)
Dept: HEALTH INFORMATION MANAGEMENT | Facility: CLINIC | Age: 65
End: 2025-05-07
Payer: COMMERCIAL

## 2025-05-25 DIAGNOSIS — R21 RASH: ICD-10-CM

## 2025-05-25 RX ORDER — BETAMETHASONE DIPROPIONATE 0.5 MG/ML
LOTION, AUGMENTED TOPICAL 2 TIMES DAILY
Qty: 60 ML | Refills: 1 | Status: SHIPPED | OUTPATIENT
Start: 2025-05-25

## 2025-07-02 DIAGNOSIS — K21.9 GASTROESOPHAGEAL REFLUX DISEASE WITHOUT ESOPHAGITIS: ICD-10-CM

## 2025-07-02 RX ORDER — OMEPRAZOLE 40 MG/1
CAPSULE, DELAYED RELEASE ORAL
Qty: 90 CAPSULE | Refills: 0 | Status: SHIPPED | OUTPATIENT
Start: 2025-07-02

## 2025-07-19 DIAGNOSIS — R21 RASH: ICD-10-CM

## 2025-07-19 RX ORDER — BETAMETHASONE DIPROPIONATE 0.5 MG/ML
LOTION, AUGMENTED TOPICAL 2 TIMES DAILY
Qty: 60 ML | Refills: 1 | Status: SHIPPED | OUTPATIENT
Start: 2025-07-19

## 2025-07-26 DIAGNOSIS — E78.00 HIGH CHOLESTEROL: ICD-10-CM

## 2025-07-26 DIAGNOSIS — R73.03 PREDIABETES: ICD-10-CM

## 2025-07-26 RX ORDER — METFORMIN HYDROCHLORIDE 500 MG/1
500 TABLET, EXTENDED RELEASE ORAL
Qty: 90 TABLET | Refills: 0 | Status: SHIPPED | OUTPATIENT
Start: 2025-07-26

## 2025-07-26 RX ORDER — ROSUVASTATIN CALCIUM 10 MG/1
10 TABLET, COATED ORAL DAILY
Qty: 90 TABLET | Refills: 0 | Status: SHIPPED | OUTPATIENT
Start: 2025-07-26

## (undated) RX ORDER — FENTANYL CITRATE 50 UG/ML
INJECTION, SOLUTION INTRAMUSCULAR; INTRAVENOUS
Status: DISPENSED
Start: 2024-08-20